# Patient Record
Sex: FEMALE | Race: WHITE | NOT HISPANIC OR LATINO | Employment: FULL TIME | URBAN - METROPOLITAN AREA
[De-identification: names, ages, dates, MRNs, and addresses within clinical notes are randomized per-mention and may not be internally consistent; named-entity substitution may affect disease eponyms.]

---

## 2023-06-18 ENCOUNTER — HOSPITAL ENCOUNTER (EMERGENCY)
Facility: HOSPITAL | Age: 43
Discharge: HOME | End: 2023-06-18
Attending: EMERGENCY MEDICINE | Admitting: EMERGENCY MEDICINE
Payer: COMMERCIAL

## 2023-06-18 ENCOUNTER — APPOINTMENT (OUTPATIENT)
Dept: GENERAL RADIOLOGY | Facility: HOSPITAL | Age: 43
End: 2023-06-18
Payer: COMMERCIAL

## 2023-06-18 ENCOUNTER — APPOINTMENT (OUTPATIENT)
Dept: CT IMAGING | Facility: HOSPITAL | Age: 43
End: 2023-06-18
Payer: COMMERCIAL

## 2023-06-18 VITALS
BODY MASS INDEX: 18.94 KG/M2 | HEIGHT: 68 IN | HEART RATE: 76 BPM | SYSTOLIC BLOOD PRESSURE: 105 MMHG | RESPIRATION RATE: 19 BRPM | TEMPERATURE: 99.5 F | OXYGEN SATURATION: 98 % | DIASTOLIC BLOOD PRESSURE: 60 MMHG | WEIGHT: 125 LBS

## 2023-06-18 DIAGNOSIS — J18.9 COMMUNITY ACQUIRED PNEUMONIA OF LEFT UPPER LOBE OF LUNG: Primary | ICD-10-CM

## 2023-06-18 LAB
ALBUMIN SERPL-MCNC: 4.1 G/DL (ref 3.5–5.2)
ALBUMIN/GLOB SERPL: 1.5 G/DL
ALP SERPL-CCNC: 63 U/L (ref 39–117)
ALT SERPL W P-5'-P-CCNC: 10 U/L (ref 1–33)
ANION GAP SERPL CALCULATED.3IONS-SCNC: 11 MMOL/L (ref 5–15)
AST SERPL-CCNC: 11 U/L (ref 1–32)
B PARAPERT DNA SPEC QL NAA+PROBE: NOT DETECTED
B PERT DNA SPEC QL NAA+PROBE: NOT DETECTED
BASOPHILS # BLD AUTO: 0.02 10*3/MM3 (ref 0–0.2)
BASOPHILS NFR BLD AUTO: 0.2 % (ref 0–1.5)
BILIRUB SERPL-MCNC: 0.6 MG/DL (ref 0–1.2)
BUN SERPL-MCNC: 10 MG/DL (ref 6–20)
BUN/CREAT SERPL: 13.2 (ref 7–25)
C PNEUM DNA NPH QL NAA+NON-PROBE: NOT DETECTED
CALCIUM SPEC-SCNC: 9 MG/DL (ref 8.6–10.5)
CHLORIDE SERPL-SCNC: 107 MMOL/L (ref 98–107)
CO2 SERPL-SCNC: 22 MMOL/L (ref 22–29)
CREAT SERPL-MCNC: 0.76 MG/DL (ref 0.57–1)
D-LACTATE SERPL-SCNC: 1.1 MMOL/L (ref 0.5–2)
DEPRECATED RDW RBC AUTO: 43.2 FL (ref 37–54)
EGFRCR SERPLBLD CKD-EPI 2021: 100.5 ML/MIN/1.73
EOSINOPHIL # BLD AUTO: 0 10*3/MM3 (ref 0–0.4)
EOSINOPHIL NFR BLD AUTO: 0 % (ref 0.3–6.2)
ERYTHROCYTE [DISTWIDTH] IN BLOOD BY AUTOMATED COUNT: 12.3 % (ref 12.3–15.4)
FLUAV SUBTYP SPEC NAA+PROBE: NOT DETECTED
FLUBV RNA ISLT QL NAA+PROBE: NOT DETECTED
GEN 5 2HR TROPONIN T REFLEX: <6 NG/L
GLOBULIN UR ELPH-MCNC: 2.8 GM/DL
GLUCOSE SERPL-MCNC: 97 MG/DL (ref 65–99)
HADV DNA SPEC NAA+PROBE: NOT DETECTED
HCOV 229E RNA SPEC QL NAA+PROBE: NOT DETECTED
HCOV HKU1 RNA SPEC QL NAA+PROBE: NOT DETECTED
HCOV NL63 RNA SPEC QL NAA+PROBE: NOT DETECTED
HCOV OC43 RNA SPEC QL NAA+PROBE: NOT DETECTED
HCT VFR BLD AUTO: 41.8 % (ref 34–46.6)
HGB BLD-MCNC: 13.1 G/DL (ref 12–15.9)
HMPV RNA NPH QL NAA+NON-PROBE: NOT DETECTED
HOLD SPECIMEN: NORMAL
HPIV1 RNA ISLT QL NAA+PROBE: NOT DETECTED
HPIV2 RNA SPEC QL NAA+PROBE: NOT DETECTED
HPIV3 RNA NPH QL NAA+PROBE: NOT DETECTED
HPIV4 P GENE NPH QL NAA+PROBE: NOT DETECTED
IMM GRANULOCYTES # BLD AUTO: 0.03 10*3/MM3 (ref 0–0.05)
IMM GRANULOCYTES NFR BLD AUTO: 0.3 % (ref 0–0.5)
LIPASE SERPL-CCNC: 18 U/L (ref 13–60)
LYMPHOCYTES # BLD AUTO: 0.66 10*3/MM3 (ref 0.7–3.1)
LYMPHOCYTES NFR BLD AUTO: 7.1 % (ref 19.6–45.3)
M PNEUMO IGG SER IA-ACNC: NOT DETECTED
MCH RBC QN AUTO: 30 PG (ref 26.6–33)
MCHC RBC AUTO-ENTMCNC: 31.3 G/DL (ref 31.5–35.7)
MCV RBC AUTO: 95.7 FL (ref 79–97)
MONOCYTES # BLD AUTO: 0.75 10*3/MM3 (ref 0.1–0.9)
MONOCYTES NFR BLD AUTO: 8 % (ref 5–12)
NEUTROPHILS NFR BLD AUTO: 7.9 10*3/MM3 (ref 1.7–7)
NEUTROPHILS NFR BLD AUTO: 84.4 % (ref 42.7–76)
NRBC BLD AUTO-RTO: 0 /100 WBC (ref 0–0.2)
NT-PROBNP SERPL-MCNC: 827.6 PG/ML (ref 0–450)
PLATELET # BLD AUTO: 145 10*3/MM3 (ref 140–450)
PMV BLD AUTO: 11.2 FL (ref 6–12)
POTASSIUM SERPL-SCNC: 3.1 MMOL/L (ref 3.5–5.2)
PROT SERPL-MCNC: 6.9 G/DL (ref 6–8.5)
RBC # BLD AUTO: 4.37 10*6/MM3 (ref 3.77–5.28)
RHINOVIRUS RNA SPEC NAA+PROBE: NOT DETECTED
RSV RNA NPH QL NAA+NON-PROBE: NOT DETECTED
SARS-COV-2 RNA NPH QL NAA+NON-PROBE: NOT DETECTED
SODIUM SERPL-SCNC: 140 MMOL/L (ref 136–145)
TROPONIN T DELTA: NORMAL
TROPONIN T SERPL HS-MCNC: <6 NG/L
WBC NRBC COR # BLD: 9.36 10*3/MM3 (ref 3.4–10.8)
WHOLE BLOOD HOLD COAG: NORMAL
WHOLE BLOOD HOLD SPECIMEN: NORMAL

## 2023-06-18 PROCEDURE — 80053 COMPREHEN METABOLIC PANEL: CPT | Performed by: EMERGENCY MEDICINE

## 2023-06-18 PROCEDURE — 96375 TX/PRO/DX INJ NEW DRUG ADDON: CPT

## 2023-06-18 PROCEDURE — 83605 ASSAY OF LACTIC ACID: CPT | Performed by: NURSE PRACTITIONER

## 2023-06-18 PROCEDURE — 25010000002 CEFTRIAXONE PER 250 MG: Performed by: NURSE PRACTITIONER

## 2023-06-18 PROCEDURE — 36415 COLL VENOUS BLD VENIPUNCTURE: CPT

## 2023-06-18 PROCEDURE — 83690 ASSAY OF LIPASE: CPT | Performed by: EMERGENCY MEDICINE

## 2023-06-18 PROCEDURE — 93005 ELECTROCARDIOGRAM TRACING: CPT

## 2023-06-18 PROCEDURE — 71275 CT ANGIOGRAPHY CHEST: CPT

## 2023-06-18 PROCEDURE — 93005 ELECTROCARDIOGRAM TRACING: CPT | Performed by: EMERGENCY MEDICINE

## 2023-06-18 PROCEDURE — 0202U NFCT DS 22 TRGT SARS-COV-2: CPT | Performed by: NURSE PRACTITIONER

## 2023-06-18 PROCEDURE — 84484 ASSAY OF TROPONIN QUANT: CPT | Performed by: EMERGENCY MEDICINE

## 2023-06-18 PROCEDURE — 71045 X-RAY EXAM CHEST 1 VIEW: CPT

## 2023-06-18 PROCEDURE — 25510000001 IOPAMIDOL PER 1 ML: Performed by: EMERGENCY MEDICINE

## 2023-06-18 PROCEDURE — 83880 ASSAY OF NATRIURETIC PEPTIDE: CPT | Performed by: EMERGENCY MEDICINE

## 2023-06-18 PROCEDURE — 96365 THER/PROPH/DIAG IV INF INIT: CPT

## 2023-06-18 PROCEDURE — 99284 EMERGENCY DEPT VISIT MOD MDM: CPT

## 2023-06-18 PROCEDURE — 87040 BLOOD CULTURE FOR BACTERIA: CPT | Performed by: NURSE PRACTITIONER

## 2023-06-18 PROCEDURE — 85025 COMPLETE CBC W/AUTO DIFF WBC: CPT | Performed by: EMERGENCY MEDICINE

## 2023-06-18 PROCEDURE — 25010000002 KETOROLAC TROMETHAMINE PER 15 MG: Performed by: NURSE PRACTITIONER

## 2023-06-18 RX ORDER — ASPIRIN 81 MG/1
324 TABLET, CHEWABLE ORAL ONCE
Status: COMPLETED | OUTPATIENT
Start: 2023-06-18 | End: 2023-06-18

## 2023-06-18 RX ORDER — SODIUM CHLORIDE 0.9 % (FLUSH) 0.9 %
10 SYRINGE (ML) INJECTION AS NEEDED
Status: DISCONTINUED | OUTPATIENT
Start: 2023-06-18 | End: 2023-06-18 | Stop reason: HOSPADM

## 2023-06-18 RX ORDER — OMEPRAZOLE 40 MG/1
40 CAPSULE, DELAYED RELEASE ORAL DAILY
COMMUNITY

## 2023-06-18 RX ORDER — ASPIRIN 81 MG/1
81 TABLET ORAL DAILY
COMMUNITY

## 2023-06-18 RX ORDER — TIZANIDINE 4 MG/1
4 TABLET ORAL NIGHTLY PRN
COMMUNITY

## 2023-06-18 RX ORDER — ACETAMINOPHEN 500 MG
1000 TABLET ORAL ONCE
Status: COMPLETED | OUTPATIENT
Start: 2023-06-18 | End: 2023-06-18

## 2023-06-18 RX ORDER — DOCUSATE CALCIUM 240 MG
240 CAPSULE ORAL 2 TIMES DAILY
COMMUNITY

## 2023-06-18 RX ORDER — CEFDINIR 300 MG/1
300 CAPSULE ORAL 2 TIMES DAILY
Qty: 14 CAPSULE | Refills: 0 | Status: SHIPPED | OUTPATIENT
Start: 2023-06-18

## 2023-06-18 RX ORDER — KETOROLAC TROMETHAMINE 15 MG/ML
15 INJECTION, SOLUTION INTRAMUSCULAR; INTRAVENOUS ONCE
Status: COMPLETED | OUTPATIENT
Start: 2023-06-18 | End: 2023-06-18

## 2023-06-18 RX ORDER — AZITHROMYCIN 250 MG/1
TABLET, FILM COATED ORAL
Qty: 6 TABLET | Refills: 0 | Status: SHIPPED | OUTPATIENT
Start: 2023-06-18

## 2023-06-18 RX ORDER — HYDROCODONE BITARTRATE AND ACETAMINOPHEN 10; 325 MG/1; MG/1
1 TABLET ORAL EVERY 6 HOURS PRN
COMMUNITY

## 2023-06-18 RX ORDER — TOPIRAMATE 100 MG/1
100 TABLET, FILM COATED ORAL 2 TIMES DAILY
COMMUNITY

## 2023-06-18 RX ADMIN — KETOROLAC TROMETHAMINE 15 MG: 15 INJECTION, SOLUTION INTRAMUSCULAR; INTRAVENOUS at 16:06

## 2023-06-18 RX ADMIN — SODIUM CHLORIDE 1 G: 900 INJECTION INTRAVENOUS at 16:08

## 2023-06-18 RX ADMIN — ASPIRIN 243 MG: 81 TABLET, CHEWABLE ORAL at 11:46

## 2023-06-18 RX ADMIN — SODIUM CHLORIDE 1000 ML: 9 INJECTION, SOLUTION INTRAVENOUS at 13:19

## 2023-06-18 RX ADMIN — IOPAMIDOL 75 ML: 755 INJECTION, SOLUTION INTRAVENOUS at 14:19

## 2023-06-18 RX ADMIN — ACETAMINOPHEN 1000 MG: 500 TABLET ORAL at 13:18

## 2023-06-18 NOTE — DISCHARGE INSTRUCTIONS
Antiobiotics as directed.  Start oral antibiotics in the morning.  Anticipate fever again over the next 48 hours.  Tylenol 650 mg to 1000 mg every 4 hours as needed.  Maintain your very best hydration and nutrition.  Take antibiotics with ample food and fluids.  Consider probiotics for the next 30 days afterward.  Follow-up this week with your primary care provider.  Return to the emergency department as needed for worsening symptoms or concerns.  Thank you
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
## 2023-06-19 LAB
QT INTERVAL: 334 MS
QT INTERVAL: 432 MS
QTC INTERVAL: 426 MS
QTC INTERVAL: 495 MS

## 2023-06-23 LAB
BACTERIA SPEC AEROBE CULT: NORMAL
BACTERIA SPEC AEROBE CULT: NORMAL

## 2023-12-12 NOTE — PROGRESS NOTES
[facility] Cardiology  Consultation H&P  Radha Florence  1980  [address]   [place] [address]     VISIT DATE:  12/13/23    PCP: Chantel Baker MD  [address]  [place] [address]    IDENTIFICATION: A 42 y.o. female  [employer] employee    PROBLEM LIST:  Family Hx CAD  Abnormal EKG  Echo, 1/19 ([facility]): EF > 55%, WNL  DDD  Migraines  GERD       CC:  Chief Complaint   Patient presents with    Establish Care     New Patient    Chest Pain       Allergies  Allergies   Allergen Reactions    Vancomycin Other (See Comments)     Vancomycin infusion reaction after completion of administration; tolerated w/ slowed infusion rate       Current Medications  Current Outpatient Medications   Medication Instructions    albuterol sulfate  (90 Base) MCG/ACT inhaler As Needed.    amitriptyline (ELAVIL) 100 mg, Oral, Nightly    docusate sodium 100 mg, Oral, As Needed    DULoxetine (CYMBALTA) 60 mg, Oral, Daily    gabapentin (NEURONTIN) 800 mg, Oral, Daily    HYDROcodone-acetaminophen (NORCO)  MG per tablet 1 tablet, Oral, Every 6 Hours PRN    lidocaine (LIDODERM) 5 % 1 patch, Topical, As Needed    Nurtec 75 mg, Oral, Every 48 Hours    pantoprazole (PROTONIX) 40 mg, Oral, Daily    predniSONE (DELTASONE) 20 MG tablet As Needed.    promethazine (PHENERGAN) 25 MG tablet As Needed.    PSYLLIUM PO 1 packet, Oral    SUMAtriptan (IMITREX) 50 mg, Oral, As Needed    tiZANidine (ZANAFLEX) 4 mg, Oral, Nightly PRN    topiramate (TOPAMAX) 100 mg, Oral, Daily        History of Present Illness   HPI  Radha Florence is a 42 y.o. year old female with the above mentioned PMH who presents for consult from Chantel Baker MD for evaluation of [place] sclerosis family history.  She is undergone EKG systolic but nothing other.  Her father was a patient of [facility] and she wanted preventive evaluation.  Notes no overt cardiac issues however she does note having recurrent pneumonias over several years over 10 in number.   "She does get choked with eating certain food types not specifically dry proteins.  She has undergone endoscopy remotely and has been on PPI for a long period of time.  She states that she gets choked and coughs regularly with eating.  She has not seen ENTs historically.    Pt denies any chest pain, dyspnea at rest, dyspnea on exertion, orthopnea, PND, palpitations, lower extremity edema, or claudication. Pt denies history of CHF, DVT, PE, MI, CVA, TIA, or rheumatic fever.       ROS: All systems have been reviewed and are negative with the exception of those mentioned in the HPI and problem list above.    SOCIAL HX  Social History     Socioeconomic History    Marital status:    Tobacco Use    Smoking status: Never    Smokeless tobacco: Never   Vaping Use    Vaping Use: Never used   Substance and Sexual Activity    Alcohol use: Never    Drug use: Never    Sexual activity: Defer       FAMILY HX  Family History   Problem Relation Age of Onset    Heart attack Mother     Heart disease Father     Lung disease Father     Diabetes Sister     Diabetes Brother        Vitals:    12/13/23 1402   BP: 110/70   BP Location: Right arm   Patient Position: Sitting   Pulse: 63   SpO2: 99%   Weight: 51.7 kg (114 lb)   Height: 175.3 cm (69\")     Body mass index is 16.83 kg/m².     PHYSICAL EXAMINATION:  Constitutional:       Appearance: Not in distress. Frail.   Neck:      Vascular: No JVR. JVD normal.   Pulmonary:      Effort: Pulmonary effort is normal.      Breath sounds: Normal breath sounds. No wheezing. No rhonchi. No rales.   Chest:      Chest wall: Not tender to palpatation.   Cardiovascular:      PMI at left midclavicular line. Normal rate. Regular rhythm. Normal S1. Normal S2.       Murmurs: There is no murmur.      No gallop.  No click. No rub.   Pulses:     Intact distal pulses.   Edema:     Peripheral edema absent.   Abdominal:      General: Bowel sounds are normal.      Palpations: Abdomen is soft.      Tenderness: " "There is no abdominal tenderness.   Musculoskeletal: Normal range of motion.         General: No tenderness. Skin:     General: Skin is warm and dry.   Neurological:      General: No focal deficit present.      Mental Status: Alert and oriented to person, place and time.         Diagnostic Data:    ECG 12 Lead    Date/Time: 12/13/2023 2:30 PM  Performed by: Palmer Suarez MD    Authorized by: Palmer Suarez MD  Comparison: compared with previous ECG   Rhythm: sinus rhythm  BPM: 64    Clinical impression: non-specific ECG          Labs:    Lab Results   Component Value Date    GLUCOSE 161 (H) 06/22/2023    CALCIUM 9.0 06/18/2023     06/18/2023    K 3.1 (L) 06/22/2023    CO2 22.0 06/18/2023     06/22/2023    BUN 10 06/18/2023    CREATININE 0.76 06/18/2023    EGFR 100.5 06/18/2023    BCR 13.2 06/18/2023    ANIONGAP 11.0 06/18/2023     No results found for: \"HGBA1C\"  Lab Results   Component Value Date    WBC 10.08 06/24/2023    HGB 10.0 (L) 06/24/2023    HCT 31.3 (L) 06/24/2023    MCV 93 06/24/2023     06/24/2023     Lipid, 2/2021:  153/59/60/81  TSH, 7/2023:  0.75  A1c, 2/2021:  5.5      Advance Care Planning   ACP discussion was held with the patient during this visit. Patient has an advance directive in EMR which is still valid.          ASSESSMENT:   Diagnosis Plan   1. Coronary artery disease involving native coronary artery of native heart without angina pectoris            PLAN:  Family history of precocious atherosclerosis with screening with cardiac calcium scoring at this time.  Predicated on these findings would delineate need for medical therapy.    Recurrent pneumonias and questionable swallowing dysfunction.  I would refer her to ENT for swallowing mechanism evaluation        Chantel Baker MD, thank you for referring Ms. Florence for evaluation.  I have forwarded my electronically generated recommendations to you for review.  Please do not hesitate to call with any " questions.      Palmer Suarez MD, FACC

## 2023-12-13 ENCOUNTER — OFFICE VISIT (OUTPATIENT)
Dept: CARDIOLOGY | Facility: CLINIC | Age: 43
End: 2023-12-13
Payer: COMMERCIAL

## 2023-12-13 VITALS
WEIGHT: 114 LBS | SYSTOLIC BLOOD PRESSURE: 110 MMHG | BODY MASS INDEX: 16.88 KG/M2 | DIASTOLIC BLOOD PRESSURE: 70 MMHG | HEART RATE: 63 BPM | OXYGEN SATURATION: 99 % | HEIGHT: 69 IN

## 2023-12-13 DIAGNOSIS — R13.10 DYSPHAGIA, UNSPECIFIED TYPE: ICD-10-CM

## 2023-12-13 DIAGNOSIS — I25.10 CORONARY ARTERY DISEASE INVOLVING NATIVE CORONARY ARTERY OF NATIVE HEART WITHOUT ANGINA PECTORIS: Primary | ICD-10-CM

## 2023-12-13 RX ORDER — PSEUDOEPHEDRINE HCL 30 MG
100 TABLET ORAL AS NEEDED
COMMUNITY
Start: 2023-11-21 | End: 2024-02-19

## 2023-12-13 RX ORDER — PANTOPRAZOLE SODIUM 40 MG/1
40 TABLET, DELAYED RELEASE ORAL DAILY
COMMUNITY
Start: 2023-11-21 | End: 2024-02-19

## 2023-12-13 RX ORDER — LIDOCAINE 50 MG/G
1 PATCH TOPICAL AS NEEDED
COMMUNITY
Start: 2023-08-17 | End: 2024-08-16

## 2023-12-13 RX ORDER — GABAPENTIN 800 MG/1
800 TABLET ORAL DAILY
COMMUNITY
Start: 2023-11-21 | End: 2023-12-21

## 2023-12-13 RX ORDER — ALBUTEROL SULFATE 90 UG/1
AEROSOL, METERED RESPIRATORY (INHALATION) AS NEEDED
COMMUNITY
Start: 2023-08-17

## 2023-12-13 RX ORDER — DULOXETIN HYDROCHLORIDE 60 MG/1
60 CAPSULE, DELAYED RELEASE ORAL DAILY
COMMUNITY
Start: 2023-11-21 | End: 2024-02-19

## 2023-12-13 RX ORDER — PROMETHAZINE HYDROCHLORIDE 25 MG/1
TABLET ORAL AS NEEDED
COMMUNITY
Start: 2023-08-17

## 2023-12-13 RX ORDER — SUMATRIPTAN 50 MG/1
50 TABLET, FILM COATED ORAL AS NEEDED
COMMUNITY
Start: 2023-11-21 | End: 2024-02-19

## 2023-12-13 RX ORDER — AMITRIPTYLINE HYDROCHLORIDE 100 MG/1
100 TABLET ORAL NIGHTLY
COMMUNITY
Start: 2023-11-21 | End: 2024-02-19

## 2023-12-13 RX ORDER — RIMEGEPANT SULFATE 75 MG/75MG
75 TABLET, ORALLY DISINTEGRATING ORAL
COMMUNITY
Start: 2023-11-21 | End: 2024-02-19

## 2023-12-13 RX ORDER — PREDNISONE 20 MG/1
TABLET ORAL AS NEEDED
COMMUNITY
Start: 2023-10-31

## 2024-02-13 ENCOUNTER — TELEPHONE (OUTPATIENT)
Dept: CARDIOLOGY | Facility: CLINIC | Age: 44
End: 2024-02-13
Payer: COMMERCIAL

## 2024-04-06 ENCOUNTER — HOSPITAL ENCOUNTER (OUTPATIENT)
Facility: HOSPITAL | Age: 44
Setting detail: OBSERVATION
Discharge: HOME | End: 2024-04-08
Attending: EMERGENCY MEDICINE | Admitting: HOSPITALIST
Payer: COMMERCIAL

## 2024-04-06 ENCOUNTER — APPOINTMENT (OUTPATIENT)
Dept: GENERAL RADIOLOGY | Facility: HOSPITAL | Age: 44
End: 2024-04-06
Payer: COMMERCIAL

## 2024-04-06 ENCOUNTER — APPOINTMENT (OUTPATIENT)
Dept: ULTRASOUND IMAGING | Facility: HOSPITAL | Age: 44
End: 2024-04-06
Payer: COMMERCIAL

## 2024-04-06 ENCOUNTER — APPOINTMENT (OUTPATIENT)
Dept: CT IMAGING | Facility: HOSPITAL | Age: 44
End: 2024-04-06
Payer: COMMERCIAL

## 2024-04-06 DIAGNOSIS — I82.90 THROMBUS: Primary | ICD-10-CM

## 2024-04-06 DIAGNOSIS — R13.10 DYSPHAGIA, UNSPECIFIED TYPE: ICD-10-CM

## 2024-04-06 PROBLEM — I82.409 DVT (DEEP VENOUS THROMBOSIS): Status: ACTIVE | Noted: 2024-04-06

## 2024-04-06 LAB
ALBUMIN SERPL-MCNC: 4.7 G/DL (ref 3.5–5.2)
ALBUMIN/GLOB SERPL: 1.7 G/DL
ALP SERPL-CCNC: 49 U/L (ref 39–117)
ALT SERPL W P-5'-P-CCNC: 9 U/L (ref 1–33)
ANION GAP SERPL CALCULATED.3IONS-SCNC: 9 MMOL/L (ref 5–15)
APTT PPP: 28.2 SECONDS (ref 60–90)
AST SERPL-CCNC: 17 U/L (ref 1–32)
BACTERIA UR QL AUTO: ABNORMAL /HPF
BASOPHILS # BLD AUTO: 0.03 10*3/MM3 (ref 0–0.2)
BASOPHILS NFR BLD AUTO: 0.5 % (ref 0–1.5)
BILIRUB SERPL-MCNC: 0.5 MG/DL (ref 0–1.2)
BILIRUB UR QL STRIP: NEGATIVE
BUN SERPL-MCNC: 15 MG/DL (ref 6–20)
BUN/CREAT SERPL: 18.8 (ref 7–25)
CALCIUM SPEC-SCNC: 9.1 MG/DL (ref 8.6–10.5)
CHLORIDE SERPL-SCNC: 107 MMOL/L (ref 98–107)
CLARITY UR: ABNORMAL
CO2 SERPL-SCNC: 25 MMOL/L (ref 22–29)
COLOR UR: YELLOW
CREAT SERPL-MCNC: 0.8 MG/DL (ref 0.57–1)
CRP SERPL-MCNC: <0.3 MG/DL (ref 0–0.5)
D DIMER PPP FEU-MCNC: 0.42 MCGFEU/ML (ref 0–0.5)
DEPRECATED RDW RBC AUTO: 41.7 FL (ref 37–54)
EGFRCR SERPLBLD CKD-EPI 2021: 93.9 ML/MIN/1.73
EOSINOPHIL # BLD AUTO: 0.09 10*3/MM3 (ref 0–0.4)
EOSINOPHIL NFR BLD AUTO: 1.4 % (ref 0.3–6.2)
ERYTHROCYTE [DISTWIDTH] IN BLOOD BY AUTOMATED COUNT: 12 % (ref 12.3–15.4)
ERYTHROCYTE [SEDIMENTATION RATE] IN BLOOD: 2 MM/HR (ref 0–20)
GLOBULIN UR ELPH-MCNC: 2.7 GM/DL
GLUCOSE SERPL-MCNC: 102 MG/DL (ref 65–99)
GLUCOSE UR STRIP-MCNC: NEGATIVE MG/DL
HCT VFR BLD AUTO: 41.9 % (ref 34–46.6)
HGB BLD-MCNC: 13.3 G/DL (ref 12–15.9)
HGB UR QL STRIP.AUTO: NEGATIVE
HOLD SPECIMEN: NORMAL
HYALINE CASTS UR QL AUTO: ABNORMAL /LPF
IMM GRANULOCYTES # BLD AUTO: 0.02 10*3/MM3 (ref 0–0.05)
IMM GRANULOCYTES NFR BLD AUTO: 0.3 % (ref 0–0.5)
INR PPP: 1.07 (ref 0.89–1.12)
KETONES UR QL STRIP: NEGATIVE
LEUKOCYTE ESTERASE UR QL STRIP.AUTO: NEGATIVE
LYMPHOCYTES # BLD AUTO: 2.02 10*3/MM3 (ref 0.7–3.1)
LYMPHOCYTES NFR BLD AUTO: 31.4 % (ref 19.6–45.3)
MAGNESIUM SERPL-MCNC: 2 MG/DL (ref 1.6–2.6)
MCH RBC QN AUTO: 29.9 PG (ref 26.6–33)
MCHC RBC AUTO-ENTMCNC: 31.7 G/DL (ref 31.5–35.7)
MCV RBC AUTO: 94.2 FL (ref 79–97)
MONOCYTES # BLD AUTO: 0.45 10*3/MM3 (ref 0.1–0.9)
MONOCYTES NFR BLD AUTO: 7 % (ref 5–12)
NEUTROPHILS NFR BLD AUTO: 3.82 10*3/MM3 (ref 1.7–7)
NEUTROPHILS NFR BLD AUTO: 59.4 % (ref 42.7–76)
NITRITE UR QL STRIP: NEGATIVE
NRBC BLD AUTO-RTO: 0 /100 WBC (ref 0–0.2)
PH UR STRIP.AUTO: 5.5 [PH] (ref 5–8)
PLATELET # BLD AUTO: 187 10*3/MM3 (ref 140–450)
PMV BLD AUTO: 10.8 FL (ref 6–12)
POTASSIUM SERPL-SCNC: 3.4 MMOL/L (ref 3.5–5.2)
PROT SERPL-MCNC: 7.4 G/DL (ref 6–8.5)
PROT UR QL STRIP: NEGATIVE
PROTHROMBIN TIME: 14 SECONDS (ref 12.2–14.5)
QT INTERVAL: 418 MS
QTC INTERVAL: 447 MS
RBC # BLD AUTO: 4.45 10*6/MM3 (ref 3.77–5.28)
RBC # UR STRIP: ABNORMAL /HPF
REF LAB TEST METHOD: ABNORMAL
SODIUM SERPL-SCNC: 141 MMOL/L (ref 136–145)
SP GR UR STRIP: 1.02 (ref 1–1.03)
SQUAMOUS #/AREA URNS HPF: ABNORMAL /HPF
TROPONIN T SERPL HS-MCNC: <6 NG/L
UFH PPP CHRO-ACNC: 0.1 IU/ML (ref 0.3–0.7)
UROBILINOGEN UR QL STRIP: ABNORMAL
WBC # UR STRIP: ABNORMAL /HPF
WBC NRBC COR # BLD AUTO: 6.43 10*3/MM3 (ref 3.4–10.8)
WHOLE BLOOD HOLD COAG: NORMAL
WHOLE BLOOD HOLD SPECIMEN: NORMAL

## 2024-04-06 PROCEDURE — 83735 ASSAY OF MAGNESIUM: CPT

## 2024-04-06 PROCEDURE — 96366 THER/PROPH/DIAG IV INF ADDON: CPT

## 2024-04-06 PROCEDURE — 71045 X-RAY EXAM CHEST 1 VIEW: CPT

## 2024-04-06 PROCEDURE — 96365 THER/PROPH/DIAG IV INF INIT: CPT

## 2024-04-06 PROCEDURE — 86235 NUCLEAR ANTIGEN ANTIBODY: CPT | Performed by: INTERNAL MEDICINE

## 2024-04-06 PROCEDURE — 74177 CT ABD & PELVIS W/CONTRAST: CPT

## 2024-04-06 PROCEDURE — 81241 F5 GENE: CPT | Performed by: INTERNAL MEDICINE

## 2024-04-06 PROCEDURE — 82378 CARCINOEMBRYONIC ANTIGEN: CPT | Performed by: INTERNAL MEDICINE

## 2024-04-06 PROCEDURE — 85732 THROMBOPLASTIN TIME PARTIAL: CPT | Performed by: INTERNAL MEDICINE

## 2024-04-06 PROCEDURE — 85306 CLOT INHIBIT PROT S FREE: CPT | Performed by: INTERNAL MEDICINE

## 2024-04-06 PROCEDURE — 25510000001 IOPAMIDOL 61 % SOLUTION: Performed by: EMERGENCY MEDICINE

## 2024-04-06 PROCEDURE — 96375 TX/PRO/DX INJ NEW DRUG ADDON: CPT

## 2024-04-06 PROCEDURE — 86147 CARDIOLIPIN ANTIBODY EA IG: CPT | Performed by: INTERNAL MEDICINE

## 2024-04-06 PROCEDURE — 99285 EMERGENCY DEPT VISIT HI MDM: CPT

## 2024-04-06 PROCEDURE — 80053 COMPREHEN METABOLIC PANEL: CPT

## 2024-04-06 PROCEDURE — 87591 N.GONORRHOEAE DNA AMP PROB: CPT | Performed by: INTERNAL MEDICINE

## 2024-04-06 PROCEDURE — 86146 BETA-2 GLYCOPROTEIN ANTIBODY: CPT | Performed by: INTERNAL MEDICINE

## 2024-04-06 PROCEDURE — G0378 HOSPITAL OBSERVATION PER HR: HCPCS

## 2024-04-06 PROCEDURE — 84484 ASSAY OF TROPONIN QUANT: CPT

## 2024-04-06 PROCEDURE — 99222 1ST HOSP IP/OBS MODERATE 55: CPT | Performed by: INTERNAL MEDICINE

## 2024-04-06 PROCEDURE — 25010000002 HEPARIN (PORCINE) PER 1000 UNITS: Performed by: INTERNAL MEDICINE

## 2024-04-06 PROCEDURE — 86148 ANTI-PHOSPHOLIPID ANTIBODY: CPT | Performed by: INTERNAL MEDICINE

## 2024-04-06 PROCEDURE — 81001 URINALYSIS AUTO W/SCOPE: CPT

## 2024-04-06 PROCEDURE — 85705 THROMBOPLASTIN INHIBITION: CPT | Performed by: INTERNAL MEDICINE

## 2024-04-06 PROCEDURE — 85730 THROMBOPLASTIN TIME PARTIAL: CPT | Performed by: INTERNAL MEDICINE

## 2024-04-06 PROCEDURE — 25010000002 ONDANSETRON PER 1 MG: Performed by: INTERNAL MEDICINE

## 2024-04-06 PROCEDURE — 85305 CLOT INHIBIT PROT S TOTAL: CPT | Performed by: INTERNAL MEDICINE

## 2024-04-06 PROCEDURE — 81240 F2 GENE: CPT | Performed by: INTERNAL MEDICINE

## 2024-04-06 PROCEDURE — 99291 CRITICAL CARE FIRST HOUR: CPT

## 2024-04-06 PROCEDURE — 85520 HEPARIN ASSAY: CPT | Performed by: INTERNAL MEDICINE

## 2024-04-06 PROCEDURE — 85652 RBC SED RATE AUTOMATED: CPT | Performed by: INTERNAL MEDICINE

## 2024-04-06 PROCEDURE — 76705 ECHO EXAM OF ABDOMEN: CPT

## 2024-04-06 PROCEDURE — 85025 COMPLETE CBC W/AUTO DIFF WBC: CPT

## 2024-04-06 PROCEDURE — 86225 DNA ANTIBODY NATIVE: CPT | Performed by: INTERNAL MEDICINE

## 2024-04-06 PROCEDURE — 85613 RUSSELL VIPER VENOM DILUTED: CPT | Performed by: INTERNAL MEDICINE

## 2024-04-06 PROCEDURE — 25010000002 HEPARIN (PORCINE) 25000-0.45 UT/250ML-% SOLUTION: Performed by: INTERNAL MEDICINE

## 2024-04-06 PROCEDURE — 85302 CLOT INHIBIT PROT C ANTIGEN: CPT | Performed by: INTERNAL MEDICINE

## 2024-04-06 PROCEDURE — 85379 FIBRIN DEGRADATION QUANT: CPT | Performed by: INTERNAL MEDICINE

## 2024-04-06 PROCEDURE — 86304 IMMUNOASSAY TUMOR CA 125: CPT | Performed by: INTERNAL MEDICINE

## 2024-04-06 PROCEDURE — 83090 ASSAY OF HOMOCYSTEINE: CPT | Performed by: INTERNAL MEDICINE

## 2024-04-06 PROCEDURE — 86301 IMMUNOASSAY TUMOR CA 19-9: CPT | Performed by: INTERNAL MEDICINE

## 2024-04-06 PROCEDURE — 85610 PROTHROMBIN TIME: CPT | Performed by: INTERNAL MEDICINE

## 2024-04-06 PROCEDURE — 85670 THROMBIN TIME PLASMA: CPT | Performed by: INTERNAL MEDICINE

## 2024-04-06 PROCEDURE — 93005 ELECTROCARDIOGRAM TRACING: CPT

## 2024-04-06 PROCEDURE — 86140 C-REACTIVE PROTEIN: CPT | Performed by: INTERNAL MEDICINE

## 2024-04-06 PROCEDURE — 82105 ALPHA-FETOPROTEIN SERUM: CPT | Performed by: INTERNAL MEDICINE

## 2024-04-06 PROCEDURE — 85300 ANTITHROMBIN III ACTIVITY: CPT | Performed by: INTERNAL MEDICINE

## 2024-04-06 PROCEDURE — 87491 CHLMYD TRACH DNA AMP PROBE: CPT | Performed by: INTERNAL MEDICINE

## 2024-04-06 PROCEDURE — 85303 CLOT INHIBIT PROT C ACTIVITY: CPT | Performed by: INTERNAL MEDICINE

## 2024-04-06 RX ORDER — SODIUM CHLORIDE 0.9 % (FLUSH) 0.9 %
10 SYRINGE (ML) INJECTION EVERY 12 HOURS SCHEDULED
Status: DISCONTINUED | OUTPATIENT
Start: 2024-04-06 | End: 2024-04-08 | Stop reason: HOSPADM

## 2024-04-06 RX ORDER — BISACODYL 10 MG
10 SUPPOSITORY, RECTAL RECTAL DAILY PRN
Status: DISCONTINUED | OUTPATIENT
Start: 2024-04-06 | End: 2024-04-08 | Stop reason: HOSPADM

## 2024-04-06 RX ORDER — ACETAMINOPHEN 325 MG/1
650 TABLET ORAL EVERY 4 HOURS PRN
Status: DISCONTINUED | OUTPATIENT
Start: 2024-04-06 | End: 2024-04-08 | Stop reason: HOSPADM

## 2024-04-06 RX ORDER — BISACODYL 5 MG/1
5 TABLET, DELAYED RELEASE ORAL DAILY PRN
Status: DISCONTINUED | OUTPATIENT
Start: 2024-04-06 | End: 2024-04-08 | Stop reason: HOSPADM

## 2024-04-06 RX ORDER — DULOXETIN HYDROCHLORIDE 60 MG/1
60 CAPSULE, DELAYED RELEASE ORAL DAILY
Status: DISCONTINUED | OUTPATIENT
Start: 2024-04-07 | End: 2024-04-08 | Stop reason: HOSPADM

## 2024-04-06 RX ORDER — POLYETHYLENE GLYCOL 3350 17 G/17G
17 POWDER, FOR SOLUTION ORAL DAILY PRN
Status: DISCONTINUED | OUTPATIENT
Start: 2024-04-06 | End: 2024-04-08 | Stop reason: HOSPADM

## 2024-04-06 RX ORDER — DIAPER,BRIEF,INFANT-TODD,DISP
1 EACH MISCELLANEOUS EVERY 12 HOURS SCHEDULED
Status: DISCONTINUED | OUTPATIENT
Start: 2024-04-06 | End: 2024-04-08 | Stop reason: HOSPADM

## 2024-04-06 RX ORDER — HEPARIN SODIUM 1000 [USP'U]/ML
50 INJECTION, SOLUTION INTRAVENOUS; SUBCUTANEOUS AS NEEDED
Status: DISCONTINUED | OUTPATIENT
Start: 2024-04-06 | End: 2024-04-06

## 2024-04-06 RX ORDER — SODIUM CHLORIDE 9 MG/ML
40 INJECTION, SOLUTION INTRAVENOUS AS NEEDED
Status: DISCONTINUED | OUTPATIENT
Start: 2024-04-06 | End: 2024-04-08 | Stop reason: HOSPADM

## 2024-04-06 RX ORDER — GABAPENTIN 400 MG/1
800 CAPSULE ORAL DAILY
Status: DISCONTINUED | OUTPATIENT
Start: 2024-04-07 | End: 2024-04-08 | Stop reason: HOSPADM

## 2024-04-06 RX ORDER — AMOXICILLIN 250 MG
2 CAPSULE ORAL 2 TIMES DAILY PRN
Status: DISCONTINUED | OUTPATIENT
Start: 2024-04-06 | End: 2024-04-08 | Stop reason: HOSPADM

## 2024-04-06 RX ORDER — HEPARIN SODIUM 10000 [USP'U]/100ML
18 INJECTION, SOLUTION INTRAVENOUS
Status: DISCONTINUED | OUTPATIENT
Start: 2024-04-06 | End: 2024-04-08

## 2024-04-06 RX ORDER — SODIUM CHLORIDE 0.9 % (FLUSH) 0.9 %
10 SYRINGE (ML) INJECTION AS NEEDED
Status: DISCONTINUED | OUTPATIENT
Start: 2024-04-06 | End: 2024-04-08 | Stop reason: HOSPADM

## 2024-04-06 RX ORDER — HEPARIN SODIUM 1000 [USP'U]/ML
80 INJECTION, SOLUTION INTRAVENOUS; SUBCUTANEOUS ONCE
Qty: 10 ML | Refills: 0 | Status: COMPLETED | OUTPATIENT
Start: 2024-04-06 | End: 2024-04-06

## 2024-04-06 RX ORDER — HEPARIN SODIUM 1000 [USP'U]/ML
25 INJECTION, SOLUTION INTRAVENOUS; SUBCUTANEOUS AS NEEDED
Status: DISCONTINUED | OUTPATIENT
Start: 2024-04-06 | End: 2024-04-06

## 2024-04-06 RX ORDER — POTASSIUM CHLORIDE 20 MEQ/1
40 TABLET, EXTENDED RELEASE ORAL EVERY 4 HOURS
Status: COMPLETED | OUTPATIENT
Start: 2024-04-06 | End: 2024-04-07

## 2024-04-06 RX ORDER — ONDANSETRON 2 MG/ML
4 INJECTION INTRAMUSCULAR; INTRAVENOUS EVERY 6 HOURS PRN
Status: DISCONTINUED | OUTPATIENT
Start: 2024-04-06 | End: 2024-04-08 | Stop reason: HOSPADM

## 2024-04-06 RX ORDER — HYDROCODONE BITARTRATE AND ACETAMINOPHEN 10; 325 MG/1; MG/1
1 TABLET ORAL EVERY 6 HOURS PRN
Status: DISCONTINUED | OUTPATIENT
Start: 2024-04-06 | End: 2024-04-08 | Stop reason: HOSPADM

## 2024-04-06 RX ADMIN — HYDROCODONE BITARTRATE AND ACETAMINOPHEN 1 TABLET: 10; 325 TABLET ORAL at 22:11

## 2024-04-06 RX ADMIN — ONDANSETRON 4 MG: 2 INJECTION INTRAMUSCULAR; INTRAVENOUS at 22:15

## 2024-04-06 RX ADMIN — POTASSIUM CHLORIDE 40 MEQ: 1500 TABLET, EXTENDED RELEASE ORAL at 22:12

## 2024-04-06 RX ADMIN — HYDROCORTISONE 1 APPLICATION: 1 OINTMENT TOPICAL at 22:13

## 2024-04-06 RX ADMIN — Medication 10 ML: at 22:16

## 2024-04-06 RX ADMIN — HEPARIN SODIUM 4100 UNITS: 1000 INJECTION INTRAVENOUS; SUBCUTANEOUS at 22:03

## 2024-04-06 RX ADMIN — HEPARIN SODIUM 18 UNITS/KG/HR: 10000 INJECTION, SOLUTION INTRAVENOUS at 22:04

## 2024-04-06 RX ADMIN — IOPAMIDOL 90 ML: 612 INJECTION, SOLUTION INTRAVENOUS at 18:00

## 2024-04-06 NOTE — ED PROVIDER NOTES
"Subjective  History of Present Illness:    Chief Complaint: Weakness, right upper quadrant abdominal pain  History of Present Illness: 43-year-old female presents with weakness, right upper quadrant abdominal pain.  She states she has had right upper quadrant abdominal pain, weight loss, and weakness.  She also reports that she has had a recent rash on her neck and groin area that is started within the last 1 to 2 days.  Seen by her primary care physician several days ago, had labs that were unremarkable, she has not had any x-ray CT scan ultrasounds performed.  She does get very nauseated when she eats.  Onset: Gradual onset  Duration: Several days  Exacerbating / Alleviating factors: Nausea with eating  Associated symptoms: Weakness      Nurses Notes reviewed and agree, including vitals, allergies, social history and prior medical history.     REVIEW OF SYSTEMS: All systems reviewed and not pertinent unless noted.    Review of Systems   Gastrointestinal:  Positive for abdominal pain and nausea.   All other systems reviewed and are negative.      Past Medical History:   Diagnosis Date    Migraine     Pneumonia        Allergies:    Vancomycin      Past Surgical History:   Procedure Laterality Date    DISC REMOVAL      lumbar    HYSTERECTOMY           Social History     Socioeconomic History    Marital status:    Tobacco Use    Smoking status: Never    Smokeless tobacco: Never   Vaping Use    Vaping status: Never Used   Substance and Sexual Activity    Alcohol use: Never    Drug use: Never    Sexual activity: Defer         Family History   Problem Relation Age of Onset    Heart attack Mother     Heart disease Father     Lung disease Father     Diabetes Sister     Diabetes Brother        Objective  Physical Exam:  /62 (BP Location: Left arm, Patient Position: Sitting)   Pulse 70   Temp 97.8 °F (36.6 °C) (Oral)   Resp 14   Ht 175.3 cm (69.02\")   Wt 51.9 kg (114 lb 6 oz)   LMP  (LMP Unknown)   SpO2 " 100%   BMI 16.88 kg/m²      Physical Exam  Vitals and nursing note reviewed.   Constitutional:       Appearance: She is well-developed.   HENT:      Head: Normocephalic and atraumatic.      Mouth/Throat:      Mouth: Mucous membranes are moist.   Cardiovascular:      Rate and Rhythm: Normal rate and regular rhythm.   Pulmonary:      Effort: Pulmonary effort is normal.      Breath sounds: Normal breath sounds.   Abdominal:      Palpations: Abdomen is soft.      Tenderness: There is abdominal tenderness.      Comments: Upper quadrant tenderness to palpation   Musculoskeletal:         General: Normal range of motion.      Cervical back: Normal range of motion and neck supple.   Skin:     General: Skin is warm and dry.   Neurological:      Mental Status: She is alert and oriented to person, place, and time.      Deep Tendon Reflexes: Reflexes are normal and symmetric.           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    Critical care was time spent personally by me on the following activities:  Blood draw for specimens, development of treatment plan with patient or surrogate, discussions with consultants, discussions with primary provider, evaluation of patient's response to treatment, examination of patient, interpretation of cardiac output measurements, obtaining history from patient or surrogate, ordering and performing treatments and interventions, ordering and review of laboratory studies, ordering and review of radiographic studies, pulse oximetry, re-evaluation of patient's condition and  review of old charts    Care discussed with: admitting provider        ED Course:    ED Course as of 04/06/24 2306   Sat Apr 06, 2024   1844 I discussed Mrs. Florence's care with LISA Ross.  I have reviewed her data and prior records.  Have reviewed up-to-date.  He has consulted GYN and will await their recommendations. [DT]   1850 CT scan shows gonadal vein thrombus, discussed the case with Dr. Finley, OB/GYN, she felt that this would likely need to be anticoagulated, she has not had experience with gonadal vein thrombus, would recommend speaking to vascular also discussed with my supervising physician Dr. Heller, we agree [CS]   1909 Discussed the case with vascular, Dr. Gonzalez, he recommended going with OB/GYN plan, he has not seen a gonadal vein and his career he stated. [CS]   1909 Discussed the case with Dr. Escobar, she agreed keep the patient for admission, and agreed with anticoagulation, she would manage further care during hospitalization. [CS]   1910 Updated the patient family at the bedside, patient will be admitted for further care [CS]      ED Course User Index  [CS] Philippe Ross Jr., PA-C  [DT] Shaka Heller MD       Lab Results (last 24 hours)       Procedure Component Value Units Date/Time    Urinalysis With Microscopic If Indicated (No Culture) - Urine, Clean Catch [[id number]]  (Abnormal) Collected: 04/06/24 1627    Specimen: Urine, Clean Catch Updated: 04/06/24 1646     Color, UA Yellow     Appearance, UA Cloudy     pH, UA 5.5     Specific Gravity, UA 1.020     Glucose, UA Negative     Ketones, UA Negative     Bilirubin, UA Negative     Blood, UA Negative     Protein, UA Negative     Leuk Esterase, UA Negative     Nitrite, UA Negative     Urobilinogen, UA 0.2 E.U./dL    Urinalysis, Microscopic Only - Urine, Clean Catch [[id number]]  (Abnormal) Collected: 04/06/24 1627    Specimen: Urine, Clean Catch Updated: 04/06/24 1646     RBC, UA 0-2 /HPF      WBC, UA 0-2 /HPF      Bacteria,  UA Trace /HPF      Squamous Epithelial Cells, UA 3-6 /HPF      Hyaline Casts, UA 0-6 /LPF      Methodology Automated Microscopy    CBC & Differential [[id number]]  (Abnormal) Collected: 04/06/24 1628    Specimen: Blood Updated: 04/06/24 1641    Narrative:      The following orders were created for panel order CBC & Differential.  Procedure                               Abnormality         Status                     ---------                               -----------         ------                     CBC Auto Differential[[id number]]        Abnormal            Final result                 Please view results for these tests on the individual orders.    Comprehensive Metabolic Panel [[id number]]  (Abnormal) Collected: 04/06/24 1628    Specimen: Blood Updated: 04/06/24 1701     Glucose 102 mg/dL      BUN 15 mg/dL      Creatinine 0.80 mg/dL      Sodium 141 mmol/L      Potassium 3.4 mmol/L      Chloride 107 mmol/L      CO2 25.0 mmol/L      Calcium 9.1 mg/dL      Total Protein 7.4 g/dL      Albumin 4.7 g/dL      ALT (SGPT) 9 U/L      AST (SGOT) 17 U/L      Alkaline Phosphatase 49 U/L      Total Bilirubin 0.5 mg/dL      Globulin 2.7 gm/dL      Comment: Calculated Result        A/G Ratio 1.7 g/dL      BUN/Creatinine Ratio 18.8     Anion Gap 9.0 mmol/L      eGFR 93.9 mL/min/1.73     Narrative:      GFR Normal >60  Chronic Kidney Disease <60  Kidney Failure <15      Single High Sensitivity Troponin T [[id number]]  (Normal) Collected: 04/06/24 1628    Specimen: Blood Updated: 04/06/24 1706     HS Troponin T <6 ng/L     Narrative:      High Sensitive Troponin T Reference Range:  <14.0 ng/L- Negative Female for AMI  <22.0 ng/L- Negative Male for AMI  >=14 - Abnormal Female indicating possible myocardial injury.  >=22 - Abnormal Male indicating possible myocardial injury.   Clinicians would have to utilize clinical acumen, EKG, Troponin, and serial changes to determine if it is an Acute Myocardial Infarction or myocardial injury due  to an underlying chronic condition.         Magnesium [[id number]]  (Normal) Collected: 04/06/24 1628    Specimen: Blood Updated: 04/06/24 1701     Magnesium 2.0 mg/dL     CBC Auto Differential [[id number]]  (Abnormal) Collected: 04/06/24 1628    Specimen: Blood Updated: 04/06/24 1641     WBC 6.43 10*3/mm3      RBC 4.45 10*6/mm3      Hemoglobin 13.3 g/dL      Hematocrit 41.9 %      MCV 94.2 fL      MCH 29.9 pg      MCHC 31.7 g/dL      RDW 12.0 %      RDW-SD 41.7 fl      MPV 10.8 fL      Platelets 187 10*3/mm3      Neutrophil % 59.4 %      Lymphocyte % 31.4 %      Monocyte % 7.0 %      Eosinophil % 1.4 %      Basophil % 0.5 %      Immature Grans % 0.3 %      Neutrophils, Absolute 3.82 10*3/mm3      Lymphocytes, Absolute 2.02 10*3/mm3      Monocytes, Absolute 0.45 10*3/mm3      Eosinophils, Absolute 0.09 10*3/mm3      Basophils, Absolute 0.03 10*3/mm3      Immature Grans, Absolute 0.02 10*3/mm3      nRBC 0.0 /100 WBC     Protime-INR [[id number]]  (Normal) Collected: 04/06/24 1628    Specimen: Blood Updated: 04/06/24 1911     Protime 14.0 Seconds      INR 1.07    aPTT [[id number]]  (Abnormal) Collected: 04/06/24 1628    Specimen: Blood Updated: 04/06/24 1911     PTT 28.2 seconds     Narrative:      PTT = The equivalent PTT values for the therapeutic range of heparin levels at 0.3 to 0.5 U/ml are 60 to 70 seconds.    D-dimer, Quantitative [[id number]]  (Normal) Collected: 04/06/24 1628    Specimen: Blood Updated: 04/06/24 1911     D-Dimer, Quantitative 0.42 MCGFEU/mL     Narrative:      According to the assay 's published package insert, a normal (<0.50 MCGFEU/mL) D-dimer result in conjunction with a non-high clinical probability assessment, excludes deep vein thrombosis (DVT) and pulmonary embolism (PE) with high sensitivity.    D-dimer values increase with age and this can make VTE exclusion of an older population difficult. To address this, the [employer], based on best available evidence  "and recent guidelines, recommends that clinicians use age-adjusted D-dimer thresholds in patients greater than 50 years of age with: a) a low probability of PE who do not meet all Pulmonary Embolism Rule Out Criteria, or b) in those with intermediate probability of PE.   The formula for an age-adjusted D-dimer cut-off is \"age/100\".  For example, a 60 year old patient would have an age-adjusted cut-off of 0.60 MCGFEU/mL and an 80 year old 0.80 MCGFEU/mL.     [[id number]] Collected: 04/06/24 1628    Specimen: Blood Updated: 04/06/24 2029    CEA [[id number]] Collected: 04/06/24 1628    Specimen: Blood Updated: 04/06/24 2029    AFP Tumor Marker [[id number]] Collected: 04/06/24 1628    Specimen: Blood Updated: 04/06/24 2029    Sedimentation Rate [[id number]]  (Normal) Collected: 04/06/24 1628    Specimen: Blood Updated: 04/06/24 2039     Sed Rate 2 mm/hr     C-reactive Protein [[id number]]  (Normal) Collected: 04/06/24 1628    Specimen: Blood Updated: 04/06/24 2100     C-Reactive Protein <0.30 mg/dL     Heparin Anti-Xa [[id number]]  (Abnormal) Collected: 04/06/24 1922    Specimen: Blood Updated: 04/06/24 1945     Heparin Anti-Xa (UFH) 0.10 IU/ml     Factor 5 Leiden [[id number]] Collected: 04/06/24 2030    Specimen: Blood Updated: 04/06/24 2115    Anticardiolipin Antibody, IgG / M, Qn [[id number]] Collected: 04/06/24 2031    Specimen: Blood Updated: 04/06/24 2115    Antithrombin III [[id number]] Collected: 04/06/24 2031    Specimen: Blood Updated: 04/06/24 2115    Beta-2 Glycoprotein Antibodies [[id number]] Collected: 04/06/24 2031    Specimen: Blood Updated: 04/06/24 2115    Homocysteine [[id number]] Collected: 04/06/24 2031    Specimen: Blood Updated: 04/06/24 2115    Lupus Anticoagulant [[id number]] Collected: 04/06/24 2031    Specimen: Blood Updated: 04/06/24 2115    Protein C Activity [[id number]] Collected: 04/06/24 2031    Specimen: Blood Updated: 04/06/24 2115    Factor II, DNA Analysis [[id number]] Collected: 04/06/24 " 2031    Specimen: Blood Updated: 04/06/24 2115    Protein S Antigen, Free [[id number]] Collected: 04/06/24 2031    Specimen: Blood Updated: 04/06/24 2115    Protein S Antigen, Total [[id number]] Collected: 04/06/24 2031    Specimen: Blood Updated: 04/06/24 2115    Protein S Functional [[id number]] Collected: 04/06/24 2031    Specimen: Blood Updated: 04/06/24 2115    Protein C Antigen, Total [[id number]] Collected: 04/06/24 2031    Specimen: Blood Updated: 04/06/24 2115    Phosphatidylserine Antibodies [[id number]] Collected: 04/06/24 2031    Specimen: Blood Updated: 04/06/24 2115    Cancer Antigen 19-9 [[id number]] Collected: 04/06/24 2031    Specimen: Blood Updated: 04/06/24 2112    [facility] Comprehensive Panel [[id number]] Collected: 04/06/24 2031    Specimen: Blood Updated: 04/06/24 2115             US Gallbladder    Result Date: 4/6/2024  US GALLBLADDER Date of Exam: 4/6/2024 6:57 PM EDT Indication: ruq abd pain. Comparison: CT abdomen and pelvis with contrast 4/6/2024 Technique: Grayscale and color Doppler ultrasound evaluation of the right upper quadrant was performed. Findings: Visualized portions of the pancreatic parenchyma demonstrate normal echogenicity, bowel gas shadowing limits complete pancreatic parenchymal assessment. The background liver echogenicity is within normal limits for technique. No perihepatic ascites. There is hepatopetal flow in the portal vein. The visualized hepatic veins are patent. The gallbladder is present. Negative for cholelithiasis. No pericholecystic fluid. Normal caliber common bile duct measuring 5 mm. The right kidney measures 9.8 x 4.6 x 4.2 cm. No right-sided hydronephrosis.     Impression: Impression: Normal exam. Electronically Signed: Ignacio Ponce MD  4/6/2024 7:24 PM EDT  Workstation ID: [id number]    XR Chest 1 View    Result Date: 4/6/2024  XR CHEST 1 VW Date of Exam: 4/6/2024 6:18 PM EDT Indication: Weak/Dizzy/AMS triage protocol Comparison: 6/18/2023 Findings: The  cardiomediastinal silhouette is within normal limits. Lungs are clear. No focal consolidation, pneumothorax, or significant pleural effusion. Osseous structures grossly intact.     Impression: Impression: No acute process. Electronically Signed: Ignacio Ponce MD  4/6/2024 6:42 PM EDT  Workstation ID: [id number]    CT Abdomen Pelvis With Contrast    Result Date: 4/6/2024  CT ABDOMEN PELVIS W CONTRAST Date of Exam: 4/6/2024 5:32 PM EDT Indication: ruq abd pain. Comparison: None available. Technique: Axial CT images were obtained of the abdomen and pelvis following the uneventful intravenous administration of 90 cc Isovue-300 . Reconstructed coronal and sagittal images were also obtained. Automated exposure control and iterative construction methods were used. FINDINGS: Lung bases: No masses. No consolidation. Liver:No masses. No intrahepatic biliary ductal dilatation. Spleen:No masses. No perisplenic hematoma. Pancreas:No pancreatic masses. No evidence of pancreatitis. Gallbladder and common bile duct:No evidence of cholelithiasis. No evidence of cholecystitis. Adrenal glands:No adrenal masses Kidneys and ureters:No kidney stones. No renal masses.No calculi present within the ureters. Normal caliber ureters. Urinary bladder:No urinary bladder wall thickening. No bladder masses. Small bowel:Normal caliber small bowel. Large bowel: Extensive colonic diverticulosis without evidence of diverticulitis. Appendix: Not seen however there is no evidence of appendicitis GENITOURINARY: Right corpus luteum cyst. Ascites or pneumoperitoneum:None. Adenopathy:None present Osseous structures:Normal Other findings: Thrombosis within a focally dilated right gonadal vein. The gonadal vein measures up to 1.4 cm.     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 No surrounding adenopathy. Normal appearance of the gallbladder. No rib fractures. Electronically Signed: Palmer Boo MD  4/6/2024 6:12 PM EDT  Workstation ID:  [id number]        Medical Decision Making  Patient Presentation 43-year-old female presented with right-sided abdominal pain, and weight loss, initial assessment she was tender palpation in her right abdomen mild guarding    DDX, Differential would include biliary leak cholecystitis, cholelithiasis, cholangitis, colitis, diverticulitis, hepatic abscess, hepatic mass, possible right lower lobe pneumonia, nephrolithiasis, pyelonephritis       Data Review/ Non ED Records /Analysis/Ordering unique tests  Review of previous  non ED visits, prior labs, prior imaging, available notes from prior evaluations or visits with specialists, medication list, allergies, past medical history, past surgical history        Independent Review Studies  I Personally reviewed all laboratory studies performed in the emergency department     Intervention/Re-evaluation intervention included heparin bolus and drip    Independent Clinician discussed the case with the on-call OB/GYN Dr. Finley, discussed the case with the on-call vascular surgeon Dr. Gonzalez, discussed with my supervising physician Dr. Heller, discussed with the on-call hospitalist Dr. Escobar    Risk Stratification tools/clinical decision rules patient presented right-sided abdominal pain found to have a gonadal thrombus, significant concern for worsening symptoms including PE or thrombolytic stroke.  High Probability of sudden clinically significant, or life threatening deterioration in the patients condition requiring direct delivery of care and management,  and high complexity decision making acute intervention included heparin bolus and drip, and admission for further care    Shared Decision Making discussed this with patient and family they were agreeable    Disposition patient stable for admission    Problems Addressed:  Thrombus: complicated acute illness or injury    Amount and/or Complexity of Data Reviewed  External Data Reviewed: labs, radiology and notes.  Labs:  ordered. Decision-making details documented in ED Course.  Radiology: ordered.    Risk  Prescription drug management.  Decision regarding hospitalization.          Final diagnoses:   Thrombus           Disposition admission       Philippe Ross Jr., PA-C  04/06/24 7125

## 2024-04-06 NOTE — H&P
[facility] Medicine Services  HISTORY AND PHYSICAL    Patient Name: Radha Florence  : 1980  MRN: [id number]  Primary Care Physician: Chantel Baker MD  Date of admission: 2024      Subjective   Subjective     Chief Complaint:  Abdominal Pain    HPI:  Radha Florence is a 43 y.o. female with PMHx significant for migraines, AUB and LGSIL pap s/p YOLA 2022 at  who presents to [facility] with vague abdominal pain and RUQ back pain. Patient notes she has been feeling unwell for a couple weeks. She reports fatigue, malaise, nausea, vague right sided abdominal pain in both her back and under her rib cage. She denies macy recent travel, no recent surgical procedures. No hx of blood clots, no family hx of clotting disorders that she is aware of. 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 She notes some weight loss and weakness. She went to her PCP several days ago and was given zofran as she continues to have some nausea with eating. She also notes scaly red rash that developed under her right armpit and around her groin over the last couple days. CT scan in the ER showed right gonadal vein thrombus.      Personal History     Past Medical History:   Diagnosis Date    Migraine     Pneumonia            Past Surgical History:   Procedure Laterality Date    DISC REMOVAL      lumbar    HYSTERECTOMY         Family History: family history includes Diabetes in her brother and sister; Heart attack in her mother; Heart disease in her father; Lung disease in her father.     Social History:  reports that she has never smoked. She has never used smokeless tobacco. She reports that she does not drink alcohol and does not use drugs.  Social History     Social History Narrative    Not on file       Medications:  Available home medication information reviewed.  DULoxetine, HYDROcodone-acetaminophen, Psyllium, SUMAtriptan, albuterol sulfate HFA, gabapentin, lidocaine,  predniSONE, promethazine, tiZANidine, and topiramate    Allergies   Allergen Reactions    Vancomycin Other (See Comments)     Vancomycin infusion reaction after completion of administration; tolerated w/ slowed infusion rate  Red Man syndrome - not an allergy        Objective   Objective     Vital Signs:   Temp:  [97.8 °F (36.6 °C)] 97.8 °F (36.6 °C)  Heart Rate:  [72] 72  Resp:  [16] 16  BP: (143)/(85) 143/85       Physical Exam   Constitutional: Awake, alert  Eyes: PERRLA, sclerae anicteric, no conjunctival injection  HENT: NCAT, mucous membranes moist  Neck: Supple, no thyromegaly, no lymphadenopathy, trachea midline  Respiratory: Clear to auscultation bilaterally, nonlabored respirations   Cardiovascular: RRR, no murmurs, rubs, or gallops, palpable pedal pulses bilaterally  Gastrointestinal: Positive bowel sounds, soft, nontender, nondistended  Musculoskeletal: No bilateral ankle edema, no clubbing or cyanosis to extremities  Psychiatric: Appropriate affect, cooperative  Neurologic: Oriented x 3, strength symmetric in all extremities, Cranial Nerves grossly intact to confrontation, speech clear  Skin: scaly eczematous appearing rash in right armpit, no raised patches      Result Review:  I have personally reviewed the results from the time of this admission to 4/6/2024 19:53 EDT and agree with these findings:  [x]  Laboratory list / accordion  [x]  Microbiology  [x]  Radiology  [x]  EKG/Telemetry   [x]  Cardiology/Vascular   [x]  Pathology  [x]  Old records  []  Other  Most notable findings include:       LAB RESULTS:      Lab 04/06/24  1922 04/06/24  1628 04/01/24  1534   WBC  --  6.43 5.03   HEMOGLOBIN  --  13.3 13.6   HEMATOCRIT  --  41.9 42.7   PLATELETS  --  187 151*   NEUTROS ABS  --  3.82 2.97   IMMATURE GRANS (ABS)  --  0.02 0.01   LYMPHS ABS  --  2.02 1.57   MONOS ABS  --  0.45 0.37   EOS ABS  --  0.09 0.09   MCV  --  94.2 96   PROTIME  --  14.0  --    INR  --  1.07  --    APTT  --  28.2*  --    HEPARIN  ANTI-XA 0.10*  --   --    D DIMER QUANT  --  0.42  --          Lab 04/06/24  1628 04/01/24  1534   SODIUM 141  --    POTASSIUM 3.4*  --    CHLORIDE 107  --    CO2 25.0  --    ANION GAP 9.0  --    BUN 15  --    CREATININE 0.80  --    EGFR 93.9  --    GLUCOSE 102*  --    CALCIUM 9.1  --    MAGNESIUM 2.0 2.0         Lab 04/06/24  1628   TOTAL PROTEIN 7.4   ALBUMIN 4.7   GLOBULIN 2.7   ALT (SGPT) 9   AST (SGOT) 17   BILIRUBIN 0.5   ALK PHOS 49         Lab 04/06/24  1628   HSTROP T <6             Lab 04/01/24  1534   IRON 104   IRON SATURATION 30   TIBC 348   TRANSFERRIN 278   FERRITIN 50   VITAMIN B 12 >2,000*         UA          4/6/2024    16:27   Urinalysis   Squamous Epithelial Cells, UA 3-6    Specific Gravity, UA 1.020    Ketones, UA Negative    Blood, UA Negative    Leukocytes, UA Negative    Nitrite, UA Negative    RBC, UA 0-2    WBC, UA 0-2    Bacteria, UA Trace        Microbiology Results (last 10 days)       ** No results found for the last 240 hours. **            US Gallbladder    Result Date: 4/6/2024  US GALLBLADDER Date of Exam: 4/6/2024 6:57 PM EDT Indication: ruq abd pain. Comparison: CT abdomen and pelvis with contrast 4/6/2024 Technique: Grayscale and color Doppler ultrasound evaluation of the right upper quadrant was performed. Findings: Visualized portions of the pancreatic parenchyma demonstrate normal echogenicity, bowel gas shadowing limits complete pancreatic parenchymal assessment. The background liver echogenicity is within normal limits for technique. No perihepatic ascites. There is hepatopetal flow in the portal vein. The visualized hepatic veins are patent. The gallbladder is present. Negative for cholelithiasis. No pericholecystic fluid. Normal caliber common bile duct measuring 5 mm. The right kidney measures 9.8 x 4.6 x 4.2 cm. No right-sided hydronephrosis.     Impression: Impression: Normal exam. Electronically Signed: Ignacio Ponce MD  4/6/2024 7:24 PM EDT  Workstation ID:  [id number]    XR Chest 1 View    Result Date: 4/6/2024  XR CHEST 1 VW Date of Exam: 4/6/2024 6:18 PM EDT Indication: Weak/Dizzy/AMS triage protocol Comparison: 6/18/2023 Findings: The cardiomediastinal silhouette is within normal limits. Lungs are clear. No focal consolidation, pneumothorax, or significant pleural effusion. Osseous structures grossly intact.     Impression: Impression: No acute process. Electronically Signed: Ignacio Ponce MD  4/6/2024 6:42 PM EDT  Workstation ID: [id number]    CT Abdomen Pelvis With Contrast    Result Date: 4/6/2024  CT ABDOMEN PELVIS W CONTRAST Date of Exam: 4/6/2024 5:32 PM EDT Indication: ruq abd pain. Comparison: None available. Technique: Axial CT images were obtained of the abdomen and pelvis following the uneventful intravenous administration of 90 cc Isovue-300 . Reconstructed coronal and sagittal images were also obtained. Automated exposure control and iterative construction methods were used. FINDINGS: Lung bases: No masses. No consolidation. Liver:No masses. No intrahepatic biliary ductal dilatation. Spleen:No masses. No perisplenic hematoma. Pancreas:No pancreatic masses. No evidence of pancreatitis. Gallbladder and common bile duct:No evidence of cholelithiasis. No evidence of cholecystitis. Adrenal glands:No adrenal masses Kidneys and ureters:No kidney stones. No renal masses.No calculi present within the ureters. Normal caliber ureters. Urinary bladder:No urinary bladder wall thickening. No bladder masses. Small bowel:Normal caliber small bowel. Large bowel: Extensive colonic diverticulosis without evidence of diverticulitis. Appendix: Not seen however there is no evidence of appendicitis GENITOURINARY: Right corpus luteum cyst. Ascites or pneumoperitoneum:None. Adenopathy:None present Osseous structures:Normal Other findings: Thrombosis within a focally dilated right gonadal vein. The gonadal vein measures up to 1.4 cm.     Impression: Thrombosis within a dilated right  gonadal vein. No surrounding adenopathy. Normal appearance of the gallbladder. No rib fractures. Electronically Signed: Palmer Boo MD  4/6/2024 6:12 PM EDT  Workstation ID: [id number]         Assessment & Plan   Assessment & Plan       Gonadal Vein Thrombosis    Radha Flroence is a 43 y.o. 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    R Gonadal Vein Thrombosis:  AUB, LGSIL on Pap s/p YOLA 12/22/2022 @ :  - rare incidental finding, currently with unclear cause  - CT abd/pelvis, GB ultrasound, UA and CXR are otherwise normal  - check bilateral LE duplex, D-dimer is negative, but consider CTA to r/o PE  - no recent travel, no recent surgical procedures  - start heparin gtt, can likely transition to PO Eliquis in the AM  - start broad spectrum work-up to include:   - tumor marks AFP, CEA, ,    - check hypercoagulable panel   - check GC/Chlamydia, hold off on further infectious work-up as she has no fever or WBC   - check COVID given recent complaints of malaise, risk factor for hypercoagulable state   - check KENYETTA w/reflex  - Consult OB/Gyn and Hematology in the AM. ED provider did discuss w/vascular team who was not familiar w/this diagnosis  - unclear association of rash, as above consider rheum involvement if KENYETTA panel positive, will use topical hydrocortisone for now    Hx Migraines    DVT prophylaxis:  Medical DVT prophylaxis orders are present.    CODE STATUS:    Code Status and Medical Interventions:   Ordered at: 04/06/24 1951     Level Of Support Discussed With:    Patient     Code Status (Patient has no pulse and is not breathing):    CPR (Attempt to Resuscitate)     Medical Interventions (Patient has pulse or is breathing):    Full Support       Expected Discharge   Expected discharge date/ time has not been documented.     Ruth Gutierrez,  DO  04/06/24

## 2024-04-06 NOTE — ED NOTES
" Radha Florence    Nursing Report ED to Floor:  Mental status: A/Ox4  Ambulatory status: Standby  Oxygen Therapy:  RA  Cardiac Rhythm: NSR  Admitted from: ED- Home   Safety Concerns:  NA  Social Issues: NA  ED Room #:  11    ED Nurse Phone Extension - [phone] or may call [phone].      HPI:   Chief Complaint   Patient presents with    Weakness - Generalized       Past Medical History:  Past Medical History:   Diagnosis Date    Migraine     Pneumonia         Past Surgical History:  Past Surgical History:   Procedure Laterality Date    DISC REMOVAL      lumbar    HYSTERECTOMY          Admitting Doctor:   Ruth Gutierrez DO    Consulting Provider(s):  Consults       No orders found from 3/8/2024 to 4/7/2024.             Admitting Diagnosis:   The encounter diagnosis was Thrombus.    Most Recent Vitals:   Vitals:    04/06/24 1614   BP: 143/85   BP Location: Left arm   Patient Position: Sitting   Pulse: 72   Resp: 16   Temp: 97.8 °F (36.6 °C)   TempSrc: Oral   SpO2: 100%   Weight: 51.3 kg (113 lb)   Height: 175.3 cm (69\")       Active LDAs/IV Access:   Lines, Drains & Airways       Active LDAs       Name Placement date Placement time Site Days    Peripheral IV 04/06/24 1628 Left Antecubital 04/06/24  1628  Antecubital  less than 1                    Labs (abnormal labs have a star):   Labs Reviewed   COMPREHENSIVE METABOLIC PANEL - Abnormal; Notable for the following components:       Result Value    Glucose 102 (*)     Potassium 3.4 (*)     All other components within normal limits    Narrative:     GFR Normal >60  Chronic Kidney Disease <60  Kidney Failure <15     URINALYSIS W/ MICROSCOPIC IF INDICATED (NO CULTURE) - Abnormal; Notable for the following components:    Appearance, UA Cloudy (*)     All other components within normal limits   CBC WITH AUTO DIFFERENTIAL - Abnormal; Notable for the following components:    RDW 12.0 (*)     All other components within normal limits   URINALYSIS, MICROSCOPIC ONLY - Abnormal; " "Notable for the following components:    Squamous Epithelial Cells, UA 3-6 (*)     All other components within normal limits   APTT - Abnormal; Notable for the following components:    PTT 28.2 (*)     All other components within normal limits    Narrative:     PTT = The equivalent PTT values for the therapeutic range of heparin levels at 0.3 to 0.5 U/ml are 60 to 70 seconds.   SINGLE HS TROPONIN T - Normal    Narrative:     High Sensitive Troponin T Reference Range:  <14.0 ng/L- Negative Female for AMI  <22.0 ng/L- Negative Male for AMI  >=14 - Abnormal Female indicating possible myocardial injury.  >=22 - Abnormal Male indicating possible myocardial injury.   Clinicians would have to utilize clinical acumen, EKG, Troponin, and serial changes to determine if it is an Acute Myocardial Infarction or myocardial injury due to an underlying chronic condition.        MAGNESIUM - Normal   PROTIME-INR - Normal   D-DIMER, QUANTITATIVE - Normal    Narrative:     According to the assay 's published package insert, a normal (<0.50 MCGFEU/mL) D-dimer result in conjunction with a non-high clinical probability assessment, excludes deep vein thrombosis (DVT) and pulmonary embolism (PE) with high sensitivity.    D-dimer values increase with age and this can make VTE exclusion of an older population difficult. To address this, the American College [employer], based on best available evidence and recent guidelines, recommends that clinicians use age-adjusted D-dimer thresholds in patients greater than 50 years of age with: a) a low probability of PE who do not meet all Pulmonary Embolism Rule Out Criteria, or b) in those with intermediate probability of PE.   The formula for an age-adjusted D-dimer cut-off is \"age/100\".  For example, a 60 year old patient would have an age-adjusted cut-off of 0.60 MCGFEU/mL and an 80 year old 0.80 MCGFEU/mL.   RAINBOW DRAW    Narrative:     The following orders were created for panel " order [place] Draw.  Procedure                               Abnormality         Status                     ---------                               -----------         ------                     Green Top (Gel)[[id number]]                                  Final result               Lavender Top[[id number]]                                     Final result               Gold Top - SST[[id number]]                                   Final result               Gray Top[[id number]]                                         In process                 Light Blue Top[[id number]]                                   Final result                 Please view results for these tests on the individual orders.   HEPARIN ANTI XA   CBC AND DIFFERENTIAL    Narrative:     The following orders were created for panel order CBC & Differential.  Procedure                               Abnormality         Status                     ---------                               -----------         ------                     CBC Auto Differential[[id number]]        Abnormal            Final result                 Please view results for these tests on the individual orders.   GREEN TOP   LAVENDER TOP   GOLD TOP - SST   LIGHT BLUE TOP   GRAY TOP       Meds Given in ED:   Medications   sodium chloride 0.9 % flush 10 mL (has no administration in time range)   heparin (porcine) injection 4,100 Units (has no administration in time range)   heparin 63265 units/250 mL (100 units/mL) in 0.45 % NaCl infusion (has no administration in time range)   Pharmacy to Dose Heparin (has no administration in time range)   iopamidol (ISOVUE-300) 61 % injection 100 mL (90 mL Intravenous Given 4/6/24 1800)     heparin, 18 Units/kg/hr  Pharmacy to Dose Heparin,          Last NIH score:                                                          Dysphagia screening results:        Albaro Coma Scale:  No data recorded     CIWA:        Restraint Type:            Isolation Status:  No  active isolations

## 2024-04-07 ENCOUNTER — APPOINTMENT (OUTPATIENT)
Dept: CARDIOLOGY | Facility: HOSPITAL | Age: 44
End: 2024-04-07
Payer: COMMERCIAL

## 2024-04-07 LAB
ALPHA-FETOPROTEIN: <2 NG/ML (ref 0–8.3)
ANION GAP SERPL CALCULATED.3IONS-SCNC: 9 MMOL/L (ref 5–15)
BASOPHILS # BLD AUTO: 0.03 10*3/MM3 (ref 0–0.2)
BASOPHILS NFR BLD AUTO: 0.6 % (ref 0–1.5)
BH CV LOWER VASCULAR LEFT COMMON FEMORAL AUGMENT: NORMAL
BH CV LOWER VASCULAR LEFT COMMON FEMORAL COMPETENT: NORMAL
BH CV LOWER VASCULAR LEFT COMMON FEMORAL COMPRESS: NORMAL
BH CV LOWER VASCULAR LEFT COMMON FEMORAL PHASIC: NORMAL
BH CV LOWER VASCULAR LEFT COMMON FEMORAL SPONT: NORMAL
BH CV LOWER VASCULAR LEFT DISTAL FEMORAL COMPRESS: NORMAL
BH CV LOWER VASCULAR LEFT GASTRONEMIUS COMPRESS: NORMAL
BH CV LOWER VASCULAR LEFT GREATER SAPH AK COMPRESS: NORMAL
BH CV LOWER VASCULAR LEFT GREATER SAPH BK COMPRESS: NORMAL
BH CV LOWER VASCULAR LEFT LESSER SAPH COMPRESS: NORMAL
BH CV LOWER VASCULAR LEFT MID FEMORAL AUGMENT: NORMAL
BH CV LOWER VASCULAR LEFT MID FEMORAL COMPETENT: NORMAL
BH CV LOWER VASCULAR LEFT MID FEMORAL COMPRESS: NORMAL
BH CV LOWER VASCULAR LEFT MID FEMORAL PHASIC: NORMAL
BH CV LOWER VASCULAR LEFT MID FEMORAL SPONT: NORMAL
BH CV LOWER VASCULAR LEFT PERONEAL COMPRESS: NORMAL
BH CV LOWER VASCULAR LEFT POPLITEAL AUGMENT: NORMAL
BH CV LOWER VASCULAR LEFT POPLITEAL COMPETENT: NORMAL
BH CV LOWER VASCULAR LEFT POPLITEAL COMPRESS: NORMAL
BH CV LOWER VASCULAR LEFT POPLITEAL PHASIC: NORMAL
BH CV LOWER VASCULAR LEFT POPLITEAL SPONT: NORMAL
BH CV LOWER VASCULAR LEFT POSTERIOR TIBIAL COMPRESS: NORMAL
BH CV LOWER VASCULAR LEFT PROXIMAL FEMORAL COMPRESS: NORMAL
BH CV LOWER VASCULAR LEFT SAPHENOFEMORAL JUNCTION COMPRESS: NORMAL
BH CV LOWER VASCULAR RIGHT COMMON FEMORAL AUGMENT: NORMAL
BH CV LOWER VASCULAR RIGHT COMMON FEMORAL COMPETENT: NORMAL
BH CV LOWER VASCULAR RIGHT COMMON FEMORAL COMPRESS: NORMAL
BH CV LOWER VASCULAR RIGHT COMMON FEMORAL PHASIC: NORMAL
BH CV LOWER VASCULAR RIGHT COMMON FEMORAL SPONT: NORMAL
BH CV LOWER VASCULAR RIGHT DISTAL FEMORAL COMPRESS: NORMAL
BH CV LOWER VASCULAR RIGHT GASTRONEMIUS COMPRESS: NORMAL
BH CV LOWER VASCULAR RIGHT GREATER SAPH AK COMPRESS: NORMAL
BH CV LOWER VASCULAR RIGHT GREATER SAPH BK COMPRESS: NORMAL
BH CV LOWER VASCULAR RIGHT LESSER SAPH COMPRESS: NORMAL
BH CV LOWER VASCULAR RIGHT MID FEMORAL AUGMENT: NORMAL
BH CV LOWER VASCULAR RIGHT MID FEMORAL COMPETENT: NORMAL
BH CV LOWER VASCULAR RIGHT MID FEMORAL COMPRESS: NORMAL
BH CV LOWER VASCULAR RIGHT MID FEMORAL PHASIC: NORMAL
BH CV LOWER VASCULAR RIGHT MID FEMORAL SPONT: NORMAL
BH CV LOWER VASCULAR RIGHT PERONEAL COMPRESS: NORMAL
BH CV LOWER VASCULAR RIGHT POPLITEAL AUGMENT: NORMAL
BH CV LOWER VASCULAR RIGHT POPLITEAL COMPETENT: NORMAL
BH CV LOWER VASCULAR RIGHT POPLITEAL COMPRESS: NORMAL
BH CV LOWER VASCULAR RIGHT POPLITEAL PHASIC: NORMAL
BH CV LOWER VASCULAR RIGHT POPLITEAL SPONT: NORMAL
BH CV LOWER VASCULAR RIGHT POSTERIOR TIBIAL COMPRESS: NORMAL
BH CV LOWER VASCULAR RIGHT PROXIMAL FEMORAL COMPRESS: NORMAL
BH CV LOWER VASCULAR RIGHT SAPHENOFEMORAL JUNCTION COMPRESS: NORMAL
BUN SERPL-MCNC: 13 MG/DL (ref 6–20)
BUN/CREAT SERPL: 17.6 (ref 7–25)
CALCIUM SPEC-SCNC: 8.3 MG/DL (ref 8.6–10.5)
CANCER AG125 SERPL QL: 18.2 U/ML (ref 0–38.1)
CANCER AG19-9 SERPL-ACNC: 14.6 U/ML
CEA SERPL-MCNC: 2.51 NG/ML
CHLORIDE SERPL-SCNC: 112 MMOL/L (ref 98–107)
CO2 SERPL-SCNC: 21 MMOL/L (ref 22–29)
CREAT SERPL-MCNC: 0.74 MG/DL (ref 0.57–1)
DEPRECATED RDW RBC AUTO: 42.4 FL (ref 37–54)
EGFRCR SERPLBLD CKD-EPI 2021: 103.1 ML/MIN/1.73
EOSINOPHIL # BLD AUTO: 0.15 10*3/MM3 (ref 0–0.4)
EOSINOPHIL NFR BLD AUTO: 3.2 % (ref 0.3–6.2)
ERYTHROCYTE [DISTWIDTH] IN BLOOD BY AUTOMATED COUNT: 12.1 % (ref 12.3–15.4)
FLUAV SUBTYP SPEC NAA+PROBE: NOT DETECTED
FLUBV RNA ISLT QL NAA+PROBE: NOT DETECTED
GLUCOSE SERPL-MCNC: 80 MG/DL (ref 65–99)
HCT VFR BLD AUTO: 38.3 % (ref 34–46.6)
HCYS SERPL-MCNC: 9.6 UMOL/L (ref 0–15)
HGB BLD-MCNC: 12.1 G/DL (ref 12–15.9)
IMM GRANULOCYTES # BLD AUTO: 0.01 10*3/MM3 (ref 0–0.05)
IMM GRANULOCYTES NFR BLD AUTO: 0.2 % (ref 0–0.5)
LYMPHOCYTES # BLD AUTO: 1.72 10*3/MM3 (ref 0.7–3.1)
LYMPHOCYTES NFR BLD AUTO: 37.1 % (ref 19.6–45.3)
MCH RBC QN AUTO: 30.4 PG (ref 26.6–33)
MCHC RBC AUTO-ENTMCNC: 31.6 G/DL (ref 31.5–35.7)
MCV RBC AUTO: 96.2 FL (ref 79–97)
MONOCYTES # BLD AUTO: 0.41 10*3/MM3 (ref 0.1–0.9)
MONOCYTES NFR BLD AUTO: 8.8 % (ref 5–12)
NEUTROPHILS NFR BLD AUTO: 2.32 10*3/MM3 (ref 1.7–7)
NEUTROPHILS NFR BLD AUTO: 50.1 % (ref 42.7–76)
NRBC BLD AUTO-RTO: 0 /100 WBC (ref 0–0.2)
PLATELET # BLD AUTO: 147 10*3/MM3 (ref 140–450)
PMV BLD AUTO: 11.4 FL (ref 6–12)
POTASSIUM SERPL-SCNC: 5 MMOL/L (ref 3.5–5.2)
RBC # BLD AUTO: 3.98 10*6/MM3 (ref 3.77–5.28)
SARS-COV-2 RNA RESP QL NAA+PROBE: NOT DETECTED
SODIUM SERPL-SCNC: 142 MMOL/L (ref 136–145)
UFH PPP CHRO-ACNC: 0.43 IU/ML (ref 0.3–0.7)
UFH PPP CHRO-ACNC: 0.51 IU/ML (ref 0.3–0.7)
UFH PPP CHRO-ACNC: 0.53 IU/ML (ref 0.3–0.7)
WBC NRBC COR # BLD AUTO: 4.64 10*3/MM3 (ref 3.4–10.8)

## 2024-04-07 PROCEDURE — 92610 EVALUATE SWALLOWING FUNCTION: CPT

## 2024-04-07 PROCEDURE — 99204 OFFICE O/P NEW MOD 45 MIN: CPT | Performed by: INTERNAL MEDICINE

## 2024-04-07 PROCEDURE — 80048 BASIC METABOLIC PNL TOTAL CA: CPT | Performed by: INTERNAL MEDICINE

## 2024-04-07 PROCEDURE — G0378 HOSPITAL OBSERVATION PER HR: HCPCS

## 2024-04-07 PROCEDURE — 96376 TX/PRO/DX INJ SAME DRUG ADON: CPT

## 2024-04-07 PROCEDURE — 85520 HEPARIN ASSAY: CPT

## 2024-04-07 PROCEDURE — 25010000002 PIPERACILLIN SOD-TAZOBACTAM PER 1 G: Performed by: OBSTETRICS & GYNECOLOGY

## 2024-04-07 PROCEDURE — 93970 EXTREMITY STUDY: CPT | Performed by: INTERNAL MEDICINE

## 2024-04-07 PROCEDURE — 25010000002 HEPARIN (PORCINE) 25000-0.45 UT/250ML-% SOLUTION: Performed by: INTERNAL MEDICINE

## 2024-04-07 PROCEDURE — 96368 THER/DIAG CONCURRENT INF: CPT

## 2024-04-07 PROCEDURE — 25010000002 DIPHENHYDRAMINE PER 50 MG: Performed by: HOSPITALIST

## 2024-04-07 PROCEDURE — 85025 COMPLETE CBC W/AUTO DIFF WBC: CPT | Performed by: INTERNAL MEDICINE

## 2024-04-07 PROCEDURE — 96366 THER/PROPH/DIAG IV INF ADDON: CPT

## 2024-04-07 PROCEDURE — 99232 SBSQ HOSP IP/OBS MODERATE 35: CPT | Performed by: HOSPITALIST

## 2024-04-07 PROCEDURE — 96375 TX/PRO/DX INJ NEW DRUG ADDON: CPT

## 2024-04-07 PROCEDURE — 25010000002 ONDANSETRON PER 1 MG: Performed by: INTERNAL MEDICINE

## 2024-04-07 PROCEDURE — 93970 EXTREMITY STUDY: CPT

## 2024-04-07 PROCEDURE — 87636 SARSCOV2 & INF A&B AMP PRB: CPT | Performed by: INTERNAL MEDICINE

## 2024-04-07 RX ORDER — SUMATRIPTAN 50 MG/1
50 TABLET, FILM COATED ORAL
Status: DISCONTINUED | OUTPATIENT
Start: 2024-04-07 | End: 2024-04-08 | Stop reason: HOSPADM

## 2024-04-07 RX ORDER — TOPIRAMATE SPINKLE 25 MG/1
100 CAPSULE ORAL DAILY
Status: DISCONTINUED | OUTPATIENT
Start: 2024-04-07 | End: 2024-04-08 | Stop reason: HOSPADM

## 2024-04-07 RX ORDER — DIPHENHYDRAMINE HYDROCHLORIDE 50 MG/ML
12.5 INJECTION INTRAMUSCULAR; INTRAVENOUS EVERY 6 HOURS PRN
Status: DISCONTINUED | OUTPATIENT
Start: 2024-04-07 | End: 2024-04-08 | Stop reason: HOSPADM

## 2024-04-07 RX ADMIN — PIPERACILLIN AND TAZOBACTAM 4.5 G: 4; .5 INJECTION, POWDER, FOR SOLUTION INTRAVENOUS at 11:33

## 2024-04-07 RX ADMIN — HYDROCORTISONE 1 APPLICATION: 1 OINTMENT TOPICAL at 21:53

## 2024-04-07 RX ADMIN — GABAPENTIN 800 MG: 400 CAPSULE ORAL at 07:51

## 2024-04-07 RX ADMIN — DIPHENHYDRAMINE HYDROCHLORIDE 12.5 MG: 50 INJECTION INTRAMUSCULAR; INTRAVENOUS at 21:53

## 2024-04-07 RX ADMIN — SUMATRIPTAN SUCCINATE 50 MG: 50 TABLET ORAL at 22:06

## 2024-04-07 RX ADMIN — ONDANSETRON 4 MG: 2 INJECTION INTRAMUSCULAR; INTRAVENOUS at 11:13

## 2024-04-07 RX ADMIN — SUMATRIPTAN SUCCINATE 50 MG: 50 TABLET ORAL at 15:38

## 2024-04-07 RX ADMIN — HYDROCODONE BITARTRATE AND ACETAMINOPHEN 1 TABLET: 10; 325 TABLET ORAL at 17:55

## 2024-04-07 RX ADMIN — DULOXETINE HYDROCHLORIDE 60 MG: 60 CAPSULE, DELAYED RELEASE ORAL at 07:50

## 2024-04-07 RX ADMIN — HEPARIN SODIUM 18 UNITS/KG/HR: 10000 INJECTION, SOLUTION INTRAVENOUS at 22:00

## 2024-04-07 RX ADMIN — POTASSIUM CHLORIDE 40 MEQ: 1500 TABLET, EXTENDED RELEASE ORAL at 01:27

## 2024-04-07 RX ADMIN — ONDANSETRON 4 MG: 2 INJECTION INTRAMUSCULAR; INTRAVENOUS at 17:48

## 2024-04-07 RX ADMIN — DIPHENHYDRAMINE HYDROCHLORIDE 12.5 MG: 50 INJECTION INTRAMUSCULAR; INTRAVENOUS at 07:50

## 2024-04-07 RX ADMIN — HYDROCODONE BITARTRATE AND ACETAMINOPHEN 1 TABLET: 10; 325 TABLET ORAL at 09:54

## 2024-04-07 RX ADMIN — TOPIRAMATE 100 MG: 25 CAPSULE, COATED PELLETS ORAL at 09:54

## 2024-04-07 RX ADMIN — PIPERACILLIN AND TAZOBACTAM 4.5 G: 4; .5 INJECTION, POWDER, FOR SOLUTION INTRAVENOUS at 17:48

## 2024-04-07 RX ADMIN — Medication 10 ML: at 21:53

## 2024-04-07 RX ADMIN — Medication 10 ML: at 07:55

## 2024-04-07 NOTE — CONSULTS
HEMATOLOGY/ONCOLOGY INPATIENT CONSULT    REASON FOR CONSULT: Right gonadal vein thrombus    Subjective   HISTORY OF PRESENT ILLNESS;   Ms Florence is a 43-year-old lady with past medical history of migraines who presented [facility] with abdominal pain and back pain.  Notes that she has been having some fatigue and nausea over the past several weeks.  Developed some right-sided abdominal pain as well as a rash in her right axilla and inguinal region.  She underwent a CT scan on presentation which noted right gonadal vein thrombus.  She does note some leg pain and discomfort but states that this is improved with steroids in the past.  Denies any swelling, redness today.  Does note a family history of blood clots in her father.  She denies any long car rides or plane rides.  Denies any recent COVID infection or vaccination.      Past Medical History:   Diagnosis Date    Migraine     Pneumonia      Past Surgical History:   Procedure Laterality Date    DISC REMOVAL      lumbar    HYSTERECTOMY         No current facility-administered medications on file prior to encounter.     Current Outpatient Medications on File Prior to Encounter   Medication Sig Dispense Refill    HYDROcodone-acetaminophen (NORCO)  MG per tablet Take 1 tablet by mouth Every 6 (Six) Hours As Needed for Moderate Pain.      lidocaine (LIDODERM) 5 % Apply 1 patch topically to the appropriate area as directed As Needed.      promethazine (PHENERGAN) 25 MG tablet As Needed.      tiZANidine (ZANAFLEX) 4 MG tablet Take 1 tablet by mouth At Night As Needed for Muscle Spasms.      topiramate (TOPAMAX) 100 MG tablet Take 1 tablet by mouth Daily.      albuterol sulfate  (90 Base) MCG/ACT inhaler As Needed.      DULoxetine (CYMBALTA) 60 MG capsule Take 1 capsule by mouth Daily.      gabapentin (NEURONTIN) 800 MG tablet Take 1 tablet by mouth Daily.      predniSONE (DELTASONE) 20 MG tablet As Needed.      PSYLLIUM PO Take 1 packet by  "mouth.      SUMAtriptan (IMITREX) 50 MG tablet Take 1 tablet by mouth As Needed.         Allergies   Allergen Reactions    Vancomycin Other (See Comments)     Vancomycin infusion reaction after completion of administration; tolerated w/ slowed infusion rate  Red Man syndrome - not an allergy        Social History     Socioeconomic History    Marital status:    Tobacco Use    Smoking status: Never    Smokeless tobacco: Never   Vaping Use    Vaping status: Never Used   Substance and Sexual Activity    Alcohol use: Never    Drug use: Never    Sexual activity: Defer       Family History   Problem Relation Age of Onset    Heart attack Mother     Heart disease Father     Lung disease Father     Diabetes Sister     Diabetes Brother          REVIEW OF SYSTEMS:  A 12 point review of systems was performed and is negative except as noted above.    Objective   PHYSICAL EXAM:    BP 92/65 (BP Location: Left arm, Patient Position: Sitting)   Pulse 67   Temp 97.6 °F (36.4 °C) (Oral)   Resp 16   Ht 175.3 cm (69.02\")   Wt 51.9 kg (114 lb 6 oz)   LMP  (LMP Unknown)   SpO2 100%   BMI 16.88 kg/m²     General: well appearing female in no acute distress  HEENT: sclerae anicteric, oropharynx clear  Lymphatics: no cervical, supraclavicular, inguinal, or axillary adenopathy  Neck: Supple. No thyromegaly.  Cardiovascular: regular rate and rhythm, no murmurs  Lungs: clear to auscultation bilaterally. No respiratory distress  Abdomen: soft, nontender, nondistended.  No palpable organomegaly  Extremities: no lower extremity edema, cyanosis, or clubbing  Skin: Rash noted in right axilla, lesions, bruising, or petechiae  Neuro: Alert and oriented x3. Moves all extremities.    Results:    Results from last 7 days   Lab Units 04/07/24  0444 04/06/24  1628 04/01/24  1534   WBC 10*3/mm3 4.64 6.43 5.03   HEMOGLOBIN g/dL 12.1 13.3 13.6   PLATELETS 10*3/mm3 147 187 151*     Results from last 7 days   Lab Units 04/07/24 0444 04/06/24  1628 "   SODIUM mmol/L 142 141   POTASSIUM mmol/L 5.0 3.4*   CO2 mmol/L 21.0* 25.0   BUN mg/dL 13 15   CREATININE mg/dL 0.74 0.80   GLUCOSE mg/dL 80 102*     Results from last 7 days   Lab Units 04/06/24  1628   AST (SGOT) U/L 17   ALT (SGPT) U/L 9   BILIRUBIN mg/dL 0.5   ALK PHOS U/L 49         US Gallbladder    Result Date: 4/6/2024  Impression: Normal exam. Electronically Signed: Ignacio Ponce MD  4/6/2024 7:24 PM EDT  Workstation ID: [id number]    XR Chest 1 View    Result Date: 4/6/2024  Impression: No acute process. Electronically Signed: Ignacio Ponce MD  4/6/2024 6:42 PM EDT  Workstation ID: [id number]    CT Abdomen Pelvis With Contrast    Result Date: 4/6/2024  Thrombosis within a dilated right gonadal vein. No surrounding adenopathy. Normal appearance of the gallbladder. No rib fractures. Electronically Signed: Palmer Boo MD  4/6/2024 6:12 PM EDT  Workstation ID: [id number]     Assessment    ASSESSMENT & PLAN:  Right gonadal vein thrombosis  -Unclear etiology at this time with appears to be unprovoked  -Bilateral lower extremity duplex pending  -Currently on heparin drip  -Okay to transition to Eliquis 10 mg twice daily x 7 days followed by 5 mg twice daily for at least 3 months  -Hypercoagulable workup panel pending  -Will plan for outpatient follow-up with me in about 2 weeks post discharge to go over her hypercoagulable workup    Rash  -Unclear etiology although given the location, probably fungal in nature  -Management per primary team    Devyn Harvey MD  Hematology and Oncology    4/7/2024  10:13 EDT

## 2024-04-07 NOTE — PLAN OF CARE
Goal Outcome Evaluation:              Outcome Evaluation: Swallowing assessment completed at bedside.  Chronic dysphagia c/o which sound esophageal. MBS with limited recommended and ordered. Continue current diet pending MBS.                 SLP Swallowing Diagnosis: functional oral phase, functional pharyngeal phase, suspected esophageal dysphagia (04/07/24 1500)

## 2024-04-07 NOTE — PROGRESS NOTES
HEPARIN INFUSION  Radha Florence is a  43 y.o. female receiving heparin infusion.     Therapy for (VTE/Cardiac):   VTE  Patient Weight: 51.3 kg  Initial Bolus (Y/N):   Y  Any Bolus (Y/N):   Y        Signs or Symptoms of Bleeding: N/A    VTE (PE/DVT)   Initial Bolus: 80 units/kg (Max 10,000 units)  Initial rate: 18 units/kg/hr (Max 1,500 units/hr)    Anti Xa Rebolus Infusion Hold time Change infusion Dose (Units/kg/hr) Next Anti Xa Level Due   < 0.11 50 Units/kg  (4000 Units Max) None Increase by  4 Units/kg/hr 6 hours   0.11 - 0.19 25 Units/kg  (2000 Units Max) None Increase by  3 Units/kg/hr 6 hours   0.2 - 0.29 0 None Increase by  2 Units/kg/hr 6 hours   0.3 - 0.7 0 None No Change 6 hours (after 2 consecutive levels in range check qAM)   0.71 - 0.8 0 None Decrease by  1 Units/kg/hr 6 hours   0.81 - 0.9 0 None Decrease by  2 Units/kg/hr 6 hours   0.91 - 1 0 60 Minutes Decrease by  3 Units/kg/hr 6 hours   >1 0 Hold  After Anti Xa less than 0.7 decrease previous rate by  4 Units/kg/hr  Every 2 hours until Anti Xa is less than 0.7 then when infusion restarts in 6 hours     Results from last 7 days   Lab Units 04/07/24  0444 04/06/24  1628 04/01/24  1534   INR   --  1.07  --    HEMOGLOBIN g/dL 12.1 13.3 13.6   HEMATOCRIT % 38.3 41.9 42.7   PLATELETS 10*3/mm3 147 187 151*          Date   Time   Anti-Xa Current Rate (Unit/kg/hr) Bolus   (Units) Rate Change   (Unit/kg/hr) New Rate (Unit/kg/hr) Next   Anti-Xa Comments  Pump Check Daily   4/6 1950 0.1 New 4100 +18 18 0400    4/7 0444 0.51 18 -- -- 18 1000 Discussed w/ nurse   4/7 1017 0.53 18 -- -- 18 1800 DW RN

## 2024-04-07 NOTE — CONSULTS
Malnutrition Severity Assessment    Patient Name:  Radha Florence  YOB: 1980  MRN: [id number]  Admit Date:  4/6/2024    Patient meets criteria for : Moderate (non-severe) Malnutrition    Comments:  Pt meets criteria for chronic, moderate malnutrition with the indicators of mild subcutaneous fat loss and moderate muscle wasting. Of note, pt reports initial unintentional wt loss beginning in 2019 and has since been stable x2yrs. Pt with non-significant weight loss of 2.6% x1 month & 5% x3 months of concern as denied changes to PO intake.     Malnutrition Severity Assessment  Malnutrition Type: Chronic Disease - Related Malnutrition  Malnutrition Type (Last 8 Hours)       Malnutrition Severity Assessment       Row Name 04/07/24 0921       Malnutrition Severity Assessment    Malnutrition Type Chronic Disease - Related Malnutrition      Row Name 04/07/24 0921       Muscle Loss    Loss of Muscle Mass Findings Moderate    Buddhism Region Moderate - slight depression    Clavicle Bone Region Moderate - some protrusion in females, visible in males    Acromion Bone Region Moderate - acromion may slightly protrude    Patellar Region Moderate - patella more prominent, less muscle definition around patella      Row Name 04/07/24 0921       Fat Loss    Subcutaneous Fat Loss Findings Mild    Orbital Region  --  mild    Upper Arm Region --  mild      Row Name 04/07/24 0921       Criteria Met (Must meet criteria for severity in at least 2 of these categories: M Wasting, Fat Loss, Fluid, Secondary Signs, Wt. Status, Intake)    Patient meets criteria for  Moderate (non-severe) Malnutrition                    Electronically signed by:  Estela Vázquez, MS,RD,LD  04/07/24 09:40 EDT

## 2024-04-07 NOTE — THERAPY EVALUATION
Acute Care - Speech Language Pathology   Swallow Initial Evaluation [facility]  Clinical Swallow Evaluation       Patient Name: Radha Florence  : 1980  MRN: [id number]  Today's Date: 2024               Admit Date: 2024    Visit Dx:     ICD-10-CM ICD-9-CM   1. Thrombus  I82.90 453.9   2. Dysphagia, unspecified type  R13.10 787.20     Patient Active Problem List   Diagnosis    Gonadal Vein Thrombosis     Past Medical History:   Diagnosis Date    Migraine     Pneumonia      Past Surgical History:   Procedure Laterality Date    DISC REMOVAL      lumbar    HYSTERECTOMY         SLP Recommendation and Plan  SLP Swallowing Diagnosis: functional oral phase, functional pharyngeal phase, suspected esophageal dysphagia (24 1500)  SLP Diet Recommendation: regular textures, thin liquids (24 1500)  Recommended Precautions and Strategies: upright posture during/after eating, reflux precautions (24 1500)  SLP Rec. for Method of Medication Administration: meds whole, meds crushed, with puree (24 1500)     Monitor for Signs of Aspiration: notify SLP if any concerns (24 1500)  Recommended Diagnostics: VFSS (MBS), with esophageal screen (24 1500)  Swallow Criteria for Skilled Therapeutic Interventions Met: demonstrates skilled criteria (24 1500)     Rehab Potential/Prognosis, Swallowing: good, to achieve stated therapy goals (24 1500)  Therapy Frequency (Swallow): PRN (24 1500)  Predicted Duration Therapy Intervention (Days): 1 day, 2 days (24 1500)  Oral Care Recommendations: Oral Care BID/PRN, Toothbrush (24 1500)                                        Outcome Evaluation: Swallowing assessment completed at bedside.  Chronic dysphagia c/o which sound esophageal. MBS with limited recommended and ordered. Continue current diet pending MBS.      SWALLOW EVALUATION (Last 72 Hours)       SLP Adult Swallow Evaluation       Row Name 24 0422                    Rehab Evaluation    Document Type evaluation  -ML        Subjective Information complains of;pain  chronic dysphagia  -ML        Patient Observations alert;cooperative  -ML        Patient/Family/Caregiver Comments/Observations chronic dysphagia, hoarseness at night- on protonix QD am.  -ML        Patient Effort adequate  -ML        Oral Care other (see comments)  Independent with oral care  -ML           General Information    Patient Profile Reviewed yes  -ML        Pertinent History Of Current Problem pt admitted with right gonadal vein thrombosis, abdominal and back pain- chronic dysphagia with certain foods, hoarseness, and reflux.  Scheduled for EGD August, 2024  -ML        Current Method of Nutrition regular textures;thin liquids  -ML        Precautions/Limitations, Vision WFL;for purposes of eval  -ML        Precautions/Limitations, Hearing WFL;for purposes of eval  -ML        Prior Level of Function-Communication WFL  -ML        Prior Level of Function-Swallowing regular textures;thin liquids  Avoidance of certain foods  -ML        Plans/Goals Discussed with patient;agreed upon  -ML        Barriers to Rehab none identified  -ML           Pain    Additional Documentation Pain Scale: Numbers Pre/Post-Treatment (Group)  -ML           Pain Scale: Numbers Pre/Post-Treatment    Pretreatment Pain Rating 7/10  -ML        Posttreatment Pain Rating 7/10  -ML        Pain Location - head  -ML        Pre/Posttreatment Pain Comment RN aware and in room with medication  -ML        Pain Intervention(s) --  RN in room with medication during evaluation.  -ML           Oral Motor Structure and Function    Dentition Assessment natural, present and adequate  -ML        Secretion Management WNL/WFL  -ML        Mucosal Quality dry  -ML        Volitional Swallow delayed  -ML        Volitional Cough WFL  -ML           Oral Musculature and Cranial Nerve Assessment    Oral Motor General Assessment WFL  -ML           General  Eating/Swallowing Observations    Respiratory Support Currently in Use room air  -ML        Eating/Swallowing Skills self-fed  -ML        Positioning During Eating upright in bed  -ML        Utensils Used spoon;cup;straw  -ML        Consistencies Trialed regular textures;thin liquids  -ML           Clinical Swallow Eval    Oral Prep Phase WFL  -ML        Oral Transit WFL  -ML        Oral Residue WFL  -ML        Pharyngeal Phase no overt signs/symptoms of pharyngeal impairment  -ML        Esophageal Phase suspected esophageal impairment  -ML        Clinical Swallow Evaluation Summary Clinical swallowing assessment completed.  Oral and pharyngeal phase appear functional. Slight delay/hesitancy in swallow trigger- pt has to be intentional with swallowing.  Pt with esophageal c/o- hoarseness/foods stick/avoidance of foods found to be problematic.  Discussed MBS with limited- pt is in agreement.  -ML           Esophageal Phase Concerns    Esophageal Phase Concerns globus;sensation of material sticking  -ML        Globus mechanical soft;regular consistencies  -ML        Sensation of Material Sticking mechanical soft;regular consistencies  -ML           SLP Evaluation Clinical Impression    SLP Swallowing Diagnosis functional oral phase;functional pharyngeal phase;suspected esophageal dysphagia  -ML        Functional Impact risk of malnutrition  -ML        Rehab Potential/Prognosis, Swallowing good, to achieve stated therapy goals  -ML        Swallow Criteria for Skilled Therapeutic Interventions Met demonstrates skilled criteria  -ML           Recommendations    Therapy Frequency (Swallow) PRN  -ML        Predicted Duration Therapy Intervention (Days) 1 day;2 days  -ML        SLP Diet Recommendation regular textures;thin liquids  -ML        Recommended Diagnostics VFSS (MBS);with esophageal screen  -ML        Recommended Precautions and Strategies upright posture during/after eating;reflux precautions  -ML        Oral  Care Recommendations Oral Care BID/PRN;Toothbrush  -ML        SLP Rec. for Method of Medication Administration meds whole;meds crushed;with puree  -ML        Monitor for Signs of Aspiration notify SLP if any concerns  -ML                  User Key  (r) = Recorded By, (t) = Taken By, (c) = Cosigned By      Initials Name Effective Dates    Kathryn Inman CCC-SLP 06/16/21 -                     EDUCATION  The patient has been educated in the following areas:   Dysphagia (Swallowing Impairment) MBS with limited upper discussed/reflux symptoms education .        SLP GOALS       Row Name 04/07/24 1500             (LTG) Patient will demonstrate functional swallow for    Diet Texture (Demonstrate functional swallow) regular textures  -ML      Liquid viscosity (Demonstrate functional swallow) thin liquids  -ML      [place] (Demonstrate functional swallow) independently (over 90% accuracy)  -ML      Time Frame (Demonstrate functional swallow) 1 week  -ML      Progress/Outcomes (Demonstrate functional swallow) new goal  -ML         (STG) Swallow Compensatory Strategies Goal 1 (SLP)    Activity (Swallow Compensatory Strategies/Techniques Goal 1, SLP) reflux precautions  -ML      [place]/Accuracy (Swallow Compensatory Strategies/Techniques Goal 1, SLP) independently (over 90% accuracy)  -ML      Time Frame (Swallow Compensatory Strategies/Techniques Goal 1, SLP) short term goal (STG);1 day;2 days  -ML      Progress/Outcomes (Swallow Compensatory Strategies/Techniques Goal 1, SLP) new goal  -ML                User Key  (r) = Recorded By, (t) = Taken By, (c) = Cosigned By      Initials Name Provider Type    Kathryn Inman CCC-SLP Speech and Language Pathologist                       Time Calculation:    Time Calculation- SLP       Row Name 04/07/24 1610             Time Calculation- SLP    SLP Start Time 1500  -ML      SLP Received On 04/07/24  -ML                User Key  (r) = Recorded By, (t) =  Taken By, (c) = Cosigned By      Initials Name Provider Type     Kathryn Redding, CCC-SLP Speech and Language Pathologist                    Therapy Charges for Today       Code Description Service Date Service Provider Modifiers Qty    [id number]  ST EVAL ORAL PHARYNG SWALLOW 3 4/7/2024 Kathryn Redding, YASEMIN-SLP GN 1                 RYAN Fink  4/7/2024

## 2024-04-07 NOTE — PROGRESS NOTES
[facility] Medicine Services  PROGRESS NOTE    Patient Name: Radha Florence  : 1980  MRN: [id number]    Date of Admission: 2024  Primary Care Physician: Chantel Baker MD    Subjective   Subjective     CC: R abdominal pain    HPI: Pain about the same. No f/c. No n/v. No dyspnea.       Objective   Objective     Vital Signs:   Temp:  [97.6 °F (36.4 °C)-97.8 °F (36.6 °C)] 97.6 °F (36.4 °C)  Heart Rate:  [55-72] 60  Resp:  [14-16] 16  BP: ()/(51-85) 105/67     Physical Exam:  NAD, alert and oriented  OP clear, dry MM  Neck supple  No LAD  RRR  CTAB  +BS, soft    Results Reviewed:  LAB RESULTS:      Lab 24  1534   WBC 4.64  --  6.43 5.03   HEMOGLOBIN 12.1  --  13.3 13.6   HEMATOCRIT 38.3  --  41.9 42.7   PLATELETS 147  --  187 151*   NEUTROS ABS 2.32  --  3.82 2.97   IMMATURE GRANS (ABS) 0.01  --  0.02 0.01   LYMPHS ABS 1.72  --  2.02 1.57   MONOS ABS 0.41  --  0.45 0.37   EOS ABS 0.15  --  0.09 0.09   MCV 96.2  --  94.2 96   SED RATE  --   --  2  --    CRP  --   --  <0.30  --    PROTIME  --   --  14.0  --    APTT  --   --  28.2*  --    HEPARIN ANTI-XA 0.51 0.10*  --   --    D DIMER QUANT  --   --  0.42  --          Lab 24  1534   SODIUM 142 141  --    POTASSIUM 5.0 3.4*  --    CHLORIDE 112* 107  --    CO2 21.0* 25.0  --    ANION GAP 9.0 9.0  --    BUN 13 15  --    CREATININE 0.74 0.80  --    EGFR 103.1 93.9  --    GLUCOSE 80 102*  --    CALCIUM 8.3* 9.1  --    MAGNESIUM  --  2.0 2.0         Lab 24  1628   TOTAL PROTEIN 7.4   ALBUMIN 4.7   GLOBULIN 2.7   ALT (SGPT) 9   AST (SGOT) 17   BILIRUBIN 0.5   ALK PHOS 49         Lab 24  1628   HSTROP T <6   PROTIME 14.0   INR 1.07             Lab 24  1534   IRON 104   IRON SATURATION 30   TIBC 348   TRANSFERRIN 278   FERRITIN 50   VITAMIN B 12 >2,000*         Brief Urine Lab Results  (Last result in the past 365 days)        Color    Clarity   Blood   Leuk Est   Nitrite   Protein   CREAT   Urine HCG        04/06/24 1627 Yellow   Cloudy   Negative   Negative   Negative   Negative                   Microbiology Results Abnormal       Procedure Component Value - Date/Time    COVID PRE-OP / PRE-PROCEDURE SCREENING ORDER (NO ISOLATION) - Swab, Nasopharynx [[id number]]  (Normal) Collected: 04/07/24 0001    Lab Status: Final result Specimen: Swab from Nasopharynx Updated: 04/07/24 0024    Narrative:      The following orders were created for panel order COVID PRE-OP / PRE-PROCEDURE SCREENING ORDER (NO ISOLATION) - Swab, Nasopharynx.  Procedure                               Abnormality         Status                     ---------                               -----------         ------                     COVID-19 and FLU A/B PCR...[[id number]]  Normal              Final result                 Please view results for these tests on the individual orders.    COVID-19 and FLU A/B PCR, 1 HR TAT - Swab, Nasopharynx [[id number]]  (Normal) Collected: 04/07/24 0001    Lab Status: Final result Specimen: Swab from Nasopharynx Updated: 04/07/24 0024     COVID19 Not Detected     Influenza A PCR Not Detected     Influenza B PCR Not Detected    Narrative:      Fact sheet for providers: https://www.fda.gov/media/963631/download    Fact sheet for patients: https://www.fda.gov/media/193805/download    Test performed by PCR.            US Gallbladder    Result Date: 4/6/2024  US GALLBLADDER Date of Exam: 4/6/2024 6:57 PM EDT Indication: ruq abd pain. Comparison: CT abdomen and pelvis with contrast 4/6/2024 Technique: Grayscale and color Doppler ultrasound evaluation of the right upper quadrant was performed. Findings: Visualized portions of the pancreatic parenchyma demonstrate normal echogenicity, bowel gas shadowing limits complete pancreatic parenchymal assessment. The background liver echogenicity is within normal limits for technique. No perihepatic ascites. There is  hepatopetal flow in the portal vein. The visualized hepatic veins are patent. The gallbladder is present. Negative for cholelithiasis. No pericholecystic fluid. Normal caliber common bile duct measuring 5 mm. The right kidney measures 9.8 x 4.6 x 4.2 cm. No right-sided hydronephrosis.     Impression: Impression: Normal exam. Electronically Signed: Ignacio Ponce MD  4/6/2024 7:24 PM EDT  Workstation ID: [id number]    XR Chest 1 View    Result Date: 4/6/2024  XR CHEST 1 VW Date of Exam: 4/6/2024 6:18 PM EDT Indication: Weak/Dizzy/AMS triage protocol Comparison: 6/18/2023 Findings: The cardiomediastinal silhouette is within normal limits. Lungs are clear. No focal consolidation, pneumothorax, or significant pleural effusion. Osseous structures grossly intact.     Impression: Impression: No acute process. Electronically Signed: Ignacio Ponce MD  4/6/2024 6:42 PM EDT  Workstation ID: [id number]    CT Abdomen Pelvis With Contrast    Result Date: 4/6/2024  CT ABDOMEN PELVIS W CONTRAST Date of Exam: 4/6/2024 5:32 PM EDT Indication: ruq abd pain. Comparison: None available. Technique: Axial CT images were obtained of the abdomen and pelvis following the uneventful intravenous administration of 90 cc Isovue-300 . Reconstructed coronal and sagittal images were also obtained. Automated exposure control and iterative construction methods were used. FINDINGS: Lung bases: No masses. No consolidation. Liver:No masses. No intrahepatic biliary ductal dilatation. Spleen:No masses. No perisplenic hematoma. Pancreas:No pancreatic masses. No evidence of pancreatitis. Gallbladder and common bile duct:No evidence of cholelithiasis. No evidence of cholecystitis. Adrenal glands:No adrenal masses Kidneys and ureters:No kidney stones. No renal masses.No calculi present within the ureters. Normal caliber ureters. Urinary bladder:No urinary bladder wall thickening. No bladder masses. Small bowel:Normal caliber small bowel. Large bowel: Extensive  colonic diverticulosis without evidence of diverticulitis. Appendix: Not seen however there is no evidence of appendicitis GENITOURINARY: Right corpus luteum cyst. Ascites or pneumoperitoneum:None. Adenopathy:None present Osseous structures:Normal Other findings: Thrombosis within a focally dilated right gonadal vein. The gonadal vein measures up to 1.4 cm.     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 No surrounding adenopathy. Normal appearance of the gallbladder. No rib fractures. Electronically Signed: Palmer Boo MD  4/6/2024 6:12 PM EDT  Workstation ID: [id number]         Current medications:  Scheduled Meds:DULoxetine, 60 mg, Oral, Daily  gabapentin, 800 mg, Oral, Daily  hydrocortisone, 1 Application, Topical, Q12H  sodium chloride, 10 mL, Intravenous, Q12H  topiramate, 100 mg, Oral, Daily      Continuous Infusions:heparin, 18 Units/kg/hr, Last Rate: 18 Units/kg/hr (04/06/24 2204)  Pharmacy to Dose Heparin,       PRN Meds:.  acetaminophen    senna-docusate sodium **AND** polyethylene glycol **AND** bisacodyl **AND** bisacodyl    Calcium Replacement - Follow Nurse / BPA Driven Protocol    HYDROcodone-acetaminophen    Magnesium Standard Dose Replacement - Follow Nurse / BPA Driven Protocol    ondansetron    Pharmacy to Dose Heparin    Phosphorus Replacement - Follow Nurse / BPA Driven Protocol    Potassium Replacement - Follow Nurse / BPA Driven Protocol    sodium chloride    sodium chloride    sodium chloride    SUMAtriptan    Assessment & Plan   Assessment & Plan     Active Hospital Problems    Diagnosis  POA    **Gonadal Vein Thrombosis [I82.409]  Yes      Resolved Hospital Problems   No resolved problems to display.        Brief Hospital Course to date:  Radha Florence is a 43 y.o. female with PMHx significant for migraines, AUB and LGSIL pap s/p YOLA 12/2022 at  who presents to [facility] with vague abdominal pain and RUQ back pain found to have incidental gonadal vein thrombus.     R Gonadal  Vein Thrombosis:  AUB, LGSIL on Pap s/p YOLA 12/22/2022 at   -rare, incidental finding, with unclear etiology  -CT A/P, US reviwed  -duplex pending  -on heparin, can likely transition to Eliquis  -coagulopathy serology ordered  -GYN/hematology consutled    Hx of migraines  -resumed home meds  Expected Discharge Location and Transportation: Home  Expected Discharge 4/7  Expected discharge date/ time has not been documented.     DVT prophylaxis:  Medical DVT prophylaxis orders are present.         AM-PAC 6 Clicks Score (PT): 24 (04/06/24 2026)    CODE STATUS:   Code Status and Medical Interventions:   Ordered at: 04/06/24 1951     Level Of Support Discussed With:    Patient     Code Status (Patient has no pulse and is not breathing):    CPR (Attempt to Resuscitate)     Medical Interventions (Patient has pulse or is breathing):    Full Support       Dion Comer MD  04/07/24

## 2024-04-07 NOTE — CONSULTS
Clinical Nutrition   Nutrition Assessment  Reason for Visit: MST score 2+, BMI, Consult, Unintentional weight loss, Reduced oral intake    Patient Name: Radha Florence  YOB: 1980  MRN: [id number]  Date of Encounter: 04/07/24 09:18 EDT  Admission date: 4/6/2024    Comments:    Pt meets criteria for chronic, moderate malnutrition with the indicators of mild subcutaneous fat loss and moderate muscle wasting. Of note, pt reports initial unintentional wt loss beginning in 2019 and has since been stable x2yrs. Pt with non-significant weight loss of 2.6% x1 month & 5% x3 months of concern as denied changes to PO intake.     Encouraged PO intake as tolerated  Continue to provide  PRN antiemetics  Declined ONS at this time  Close monitoring of meal intake  SLP consult requested  Reported difficulty swallowing meds and foods (? Esophageal based on description)  Continue home bowel regimen  Chronic constipation on home bowel regimen with BM 1x weekly    Nutrition Assessment   Admission Diagnosis:  DVT (deep venous thrombosis) [I82.409]    Problem List:    Gonadal Vein Thrombosis  Moderate Malnutrition    PMH:   She  has a past medical history of Migraine and Pneumonia.    PSH:  She  has a past surgical history that includes Hysterectomy and Disc removal.    Applicable Nutrition Concerns:   Skin:  Oral:  GI:    Applicable Interval History:   N/A      Reported/Observed/Food/Nutrition Related History:     Pt laying in bed with spouse present at time of visit, able to provide weight/nutrition hx. Endorsed good appetite however reports nausea with PO intake - resolved with antiemetics. Pt with unintentional weight loss despite no changes to PO intake. Reports initial weight loss starting in 2019 and stabilizing in 2022 - denies weight loss following YOLA (2022). Discussed ONS, pt has general distaste for supplements. Chronic constipation - takes home bowel regimen (Amitiza, docusate, etc) however  "consistently 1 BM per wk. Endorsed dysphagia - has been unable to be evaluated outpatient due to scheduling challenges, agreeable to SLP consult. [facility]     Anthropometrics     Height: Height: 175.3 cm (69.02\")  Last Filed Weight: Weight: 51.9 kg (114 lb 6 oz) (04/06/24 2030)  Method: Weight Method: Standing scale  BMI: BMI (Calculated): 16.9    UBW:     Weight      Weight (kg) Weight (lbs) Weight Method   6/18/2023 56.7 kg  125 lb  Stated    12/13/2023 51.71 kg  114 lb     4/6/2024 51.88 kg  114 lb 6 oz  Standing scale     51.256 kg  113 lb       3/5/24: 117#   1/8/24: 120#  10/31/23: 125#    Weight change: weight loss of 11 lbs (8.8%) over the past 6 month(s)    Intentional?  No    Nutrition Focused Physical Exam     Date:  4/7       Patient meets criteria for malnutrition diagnosis, see MSA note.     Current Nutrition Prescription   PO: Diet: Regular/House; Fluid Consistency: Thin (IDDSI 0)  Oral Nutrition Supplement: N/A  Intake:  ~50% of breakfast observed    Nutrition Diagnosis   Date:  4/7            Updated:    Problem Malnutrition  chronic, moderate   Etiology Energy in < energy out   Signs/Symptoms mild subcutaneous fat loss and moderate muscle wasting   Status: New    Date:  4/7    Updated:     Problem Predicted suboptimal energy intake   Etiology ? Dysphagia and nausea   Signs/Symptoms ~50% at breakfast, SLP consult requested   Status: New    Goal:   Nutrition to support treatment and Tolerate PO, Increase intake      Nutrition Intervention      Follow treatment progress, Care plan reviewed, Advise alternate selection, Encourage intake, Supplement offered/refused      Monitoring/Evaluation:   Per protocol, I&O, PO intake, Weight, Symptoms, POC/GOC, Swallow function      Estela Vázquez, MS,RD,LD  Time Spent: 25min  "

## 2024-04-07 NOTE — CONSULTS
[facility]  GYN History and Physical    Chief Complaint   Patient presents with    Weakness - Generalized       Subjective     Patient is a 43 y.o. 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  Patient presented to ED for abdominal pain, nausea, and RUQ pain.  She reports 2 weeks of fatigue and nausea.  She reports one week of intermittent chills, has not checked  a temperature.  She c/o years of bilateral lower leg pain and cramping.  She developed a pruritic rash in armpits and groin over the last week. She reports years of pelvic pain.     She is s/p TRH at  2022 for recurrent abnormal pap smears.  She is s/p LSC tubal ligation in .  She has had 5 SVDs. Weights ranged from 6-8#.        The following portions of the patients history were reviewed and updated as appropriate: current medications, allergies, past medical history, past surgical history, past family history, past social history, and problem list .               Past OB History:       OB History    Para Term  AB Living   6 5 4 1 1 5   SAB IAB Ectopic Molar Multiple Live Births   1 0 0 0 0 5      # Outcome Date GA Lbr Pedrito/2nd Weight Sex Type Anes PTL Lv   6 Term     M Vag-Spont   JIMI   5 Term 2009    M Vag-Spont   JIMI   4 Term 2006    F Vag-Spont   JIMI   3      F Vag-Spont   JIMI   2 Term     F Vag-Spont   JIMI   1 SAB                Past Medical History: Past Medical History:   Diagnosis Date    Migraine     Pneumonia       Past Surgical History Past Surgical History:   Procedure Laterality Date    DISC REMOVAL      lumbar    HYSTERECTOMY        Family History: Family History   Problem Relation Age of Onset    Heart attack Mother     Heart disease Father     Lung disease Father     Diabetes Sister     Diabetes Brother       Social History:  reports that she has never smoked. She has never used smokeless tobacco.   reports no history of alcohol use.   reports no history of  drug use.        General ROS: Pertinent items are noted in HPI, all other systems reviewed and negative    Objective       Vital Signs Range for the last 24 hours  Temperature: Temp:  [97.6 °F (36.4 °C)-97.8 °F (36.6 °C)] 97.6 °F (36.4 °C)   Temp Source: Temp src: Oral   BP: BP: ()/(51-85) 92/65   Pulse: Heart Rate:  [55-72] 67   Respirations: Resp:  [14-16] 16   SPO2: SpO2:  [98 %-100 %] 100 %   O2 Amount (l/min):     O2 Devices Device (Oxygen Therapy): room air   Weight: Weight:  [51.3 kg (113 lb)-51.9 kg (114 lb 6 oz)] 51.9 kg (114 lb 6 oz)               Physical Examination: General appearance - alert, well appearing, and in no distress  Abdomen - soft, nontender, nondistended, no masses or organomegaly  Musculoskeletal - no joint tenderness, deformity or swelling  Extremities - peripheral pulses normal, no pedal edema, no clubbing or cyanosis  Skin - normal coloration and turgor, no rashes, no suspicious skin lesions noted        Laboratory Results:   Results from last 7 days   Lab Units 04/07/24  0444   WBC 10*3/mm3 4.64   HEMOGLOBIN g/dL 12.1   HEMATOCRIT % 38.3   PLATELETS 10*3/mm3 147         Component      Latest Ref Rng 4/7/2024   Glucose      65 - 99 mg/dL 80    BUN      6 - 20 mg/dL 13    Creatinine      0.57 - 1.00 mg/dL 0.74    Sodium      136 - 145 mmol/L 142    Potassium      3.5 - 5.2 mmol/L 5.0    Chloride      98 - 107 mmol/L 112 (H)    CO2      22.0 - 29.0 mmol/L 21.0 (L)    Calcium      8.6 - 10.5 mg/dL 8.3 (L)    BUN/Creatinine Ratio      7.0 - 25.0  17.6    Anion Gap      5.0 - 15.0 mmol/L 9.0    eGFR      >60.0 mL/min/1.73 103.1              CT A/P  FINDINGS:     Lung bases: No masses. No consolidation.     Liver:No masses. No intrahepatic biliary ductal dilatation.     Spleen:No masses. No perisplenic hematoma.     Pancreas:No pancreatic masses. No evidence of pancreatitis.     Gallbladder and common bile duct:No evidence of cholelithiasis. No evidence of cholecystitis.     Adrenal  "glands:No adrenal masses     Kidneys and ureters:No kidney stones. No renal masses.No calculi present within the ureters. Normal caliber ureters.     Urinary bladder:No urinary bladder wall thickening. No bladder masses.     Small bowel:Normal caliber small bowel.     Large bowel: Extensive colonic diverticulosis without evidence of diverticulitis.     Appendix: Not seen however there is no evidence of appendicitis     GENITOURINARY: Right corpus luteum cyst.     Ascites or pneumoperitoneum:None.     Adenopathy:None present     Osseous structures:Normal     Other findings: Thrombosis within a focally dilated right gonadal vein. The gonadal vein measures up to 1.4 cm.     IMPRESSION:  Thrombosis within a dilated right gonadal vein. No surrounding adenopathy. Normal appearance of the gallbladder. No rib fractures.      Assessment & Plan       Gonadal Vein Thrombosis        Assessment:  1.  44yo with right ovarian vein thrombosis of uncertain etiology.  Vague pelvic pain for \"years\" and nausea and chills for 1-2 weeks.      Plan:  1. Agree with anticoagulation, thrombophilia workup, duplex US to evaluate for LE VTE  2. Does not clinically meet picture for septic pelvic thrombophlebitis (afebrile, no leukocytosis), but out of an abundance of caution, recommend zosyn while inpatient.  Orders placed.  No indication for outpatient antibiotics.  3. Recommend outpatient follow up with her GYN at  as well as consultation with [facility] in case of a vascular condition such as pelvic congestion syndrome or May Thurner Syndrome.    4. Plan of care has been reviewed with patient and her   5.  Risks, benefits of treatment plan have been discussed.  6.  All questions have been answered.  7. Remainder of cares per primary team    Thank you for this consultation.  Gynecology will sign off at this time, but please call with any further questions.          Jeannine Finley MD  4/7/2024  10:22 EDT   "

## 2024-04-07 NOTE — PLAN OF CARE
Problem: Adult Inpatient Plan of Care  Goal: Plan of Care Review  Outcome: Ongoing, Progressing  Goal: Patient-Specific Goal (Individualized)  Outcome: Ongoing, Progressing  Goal: Absence of Hospital-Acquired Illness or Injury  Outcome: Ongoing, Progressing  Intervention: Identify and Manage Fall Risk  Recent Flowsheet Documentation  Taken 4/7/2024 0400 by Jeannine Zambrano RN  Safety Promotion/Fall Prevention:   assistive device/personal items within reach   clutter free environment maintained   fall prevention program maintained   nonskid shoes/slippers when out of bed   room organization consistent   safety round/check completed  Taken 4/7/2024 0000 by Jeannine Zambrano RN  Safety Promotion/Fall Prevention:   assistive device/personal items within reach   clutter free environment maintained   fall prevention program maintained   nonskid shoes/slippers when out of bed   room organization consistent   safety round/check completed  Taken 4/6/2024 2200 by Jeannine Zambrano RN  Safety Promotion/Fall Prevention:   assistive device/personal items within reach   clutter free environment maintained   fall prevention program maintained   nonskid shoes/slippers when out of bed   room organization consistent   safety round/check completed  Taken 4/6/2024 2000 by Jeannine Zambrano RN  Safety Promotion/Fall Prevention:   assistive device/personal items within reach   clutter free environment maintained   fall prevention program maintained   nonskid shoes/slippers when out of bed   room organization consistent   safety round/check completed  Intervention: Prevent Skin Injury  Recent Flowsheet Documentation  Taken 4/7/2024 0400 by Jeannine Zambrano RN  Body Position:   position changed independently   weight shifting  Skin Protection:   tubing/devices free from skin contact   transparent dressing maintained   skin-to-device areas padded  Taken 4/7/2024 0000 by Jeannine Zambrano RN  Body Position:   position changed  independently   weight shifting  Skin Protection:   tubing/devices free from skin contact   transparent dressing maintained   skin-to-device areas padded  Taken 4/6/2024 2200 by Jeannine Zambrano RN  Body Position:   position changed independently   weight shifting  Skin Protection:   tubing/devices free from skin contact   transparent dressing maintained   skin-to-device areas padded  Taken 4/6/2024 2000 by Jeannine Zambrano RN  Body Position:   position changed independently   weight shifting  Skin Protection:   tubing/devices free from skin contact   transparent dressing maintained   skin-to-device areas padded  Intervention: Prevent and Manage VTE (Venous Thromboembolism) Risk  Recent Flowsheet Documentation  Taken 4/7/2024 0400 by Jeannine Zambrano RN  Activity Management: activity encouraged  Taken 4/7/2024 0000 by Jeannine Zambrano RN  Activity Management: activity encouraged  Taken 4/6/2024 2200 by Jeannine Zambrano RN  Activity Management: activity encouraged  Taken 4/6/2024 2000 by Jeannine Zambrano RN  Activity Management: activity encouraged  Intervention: Prevent Infection  Recent Flowsheet Documentation  Taken 4/7/2024 0400 by Jeannine Zambrano RN  Infection Prevention:   environmental surveillance performed   hand hygiene promoted   personal protective equipment utilized   single patient room provided  Taken 4/7/2024 0000 by Jeannine Zambrano RN  Infection Prevention:   environmental surveillance performed   hand hygiene promoted   personal protective equipment utilized   single patient room provided  Taken 4/6/2024 2200 by Jeannine Zambrano RN  Infection Prevention:   environmental surveillance performed   hand hygiene promoted   personal protective equipment utilized   single patient room provided  Taken 4/6/2024 2000 by Jeannine Zambrano RN  Infection Prevention:   environmental surveillance performed   hand hygiene promoted   personal protective equipment utilized   single patient room  provided  Goal: Optimal Comfort and Wellbeing  Outcome: Ongoing, Progressing  Intervention: Monitor Pain and Promote Comfort  Recent Flowsheet Documentation  Taken 4/6/2024 2200 by Jeannine Zambrano RN  Pain Management Interventions:   pain management plan reviewed with patient/caregiver   see MAR  Taken 4/6/2024 2000 by Jeannine Zambrano RN  Pain Management Interventions: pain management plan reviewed with patient/caregiver  Intervention: Provide Person-Centered Care  Recent Flowsheet Documentation  Taken 4/6/2024 2000 by Jeannine Zambrano RN  Trust Relationship/Rapport:   care explained   choices provided   emotional support provided   empathic listening provided   questions answered   questions encouraged   reassurance provided   thoughts/feelings acknowledged  Goal: Readiness for Transition of Care  Outcome: Ongoing, Progressing     Problem: Fall Injury Risk  Goal: Absence of Fall and Fall-Related Injury  Outcome: Ongoing, Progressing  Intervention: Identify and Manage Contributors  Recent Flowsheet Documentation  Taken 4/7/2024 0000 by Jeannine Zambrano RN  Medication Review/Management: medications reviewed  Taken 4/6/2024 2200 by Jeannine Zambrano RN  Medication Review/Management: medications reviewed  Taken 4/6/2024 2000 by Jeannine Zambrano RN  Medication Review/Management: medications reviewed  Intervention: Promote Injury-Free Environment  Recent Flowsheet Documentation  Taken 4/7/2024 0400 by Jeannine Zambrano RN  Safety Promotion/Fall Prevention:   assistive device/personal items within reach   clutter free environment maintained   fall prevention program maintained   nonskid shoes/slippers when out of bed   room organization consistent   safety round/check completed  Taken 4/7/2024 0000 by Jeannine Zambrano RN  Safety Promotion/Fall Prevention:   assistive device/personal items within reach   clutter free environment maintained   fall prevention program maintained   nonskid shoes/slippers when out of  bed   room organization consistent   safety round/check completed  Taken 4/6/2024 2200 by Jeannine Zambrano, RN  Safety Promotion/Fall Prevention:   assistive device/personal items within reach   clutter free environment maintained   fall prevention program maintained   nonskid shoes/slippers when out of bed   room organization consistent   safety round/check completed  Taken 4/6/2024 2000 by Jeannine Zambrano, RN  Safety Promotion/Fall Prevention:   assistive device/personal items within reach   clutter free environment maintained   fall prevention program maintained   nonskid shoes/slippers when out of bed   room organization consistent   safety round/check completed   Goal Outcome Evaluation:

## 2024-04-08 ENCOUNTER — APPOINTMENT (OUTPATIENT)
Dept: ULTRASOUND IMAGING | Facility: HOSPITAL | Age: 44
End: 2024-04-08
Payer: COMMERCIAL

## 2024-04-08 VITALS
TEMPERATURE: 98.3 F | RESPIRATION RATE: 16 BRPM | SYSTOLIC BLOOD PRESSURE: 115 MMHG | HEIGHT: 69 IN | WEIGHT: 114 LBS | OXYGEN SATURATION: 97 % | DIASTOLIC BLOOD PRESSURE: 71 MMHG | BODY MASS INDEX: 16.88 KG/M2 | HEART RATE: 64 BPM

## 2024-04-08 PROBLEM — E44.0 MODERATE MALNUTRITION: Status: ACTIVE | Noted: 2024-04-08

## 2024-04-08 LAB
CARDIOLIPIN IGG SER IA-ACNC: <9 GPL U/ML (ref 0–14)
CARDIOLIPIN IGM SER IA-ACNC: 13 MPL U/ML (ref 0–12)
F5 GENE MUT ANL BLD/T: NORMAL
FACTOR II, DNA ANALYSIS: NORMAL
UFH PPP CHRO-ACNC: 0.39 IU/ML (ref 0.3–0.7)

## 2024-04-08 PROCEDURE — 25010000002 ONDANSETRON PER 1 MG: Performed by: INTERNAL MEDICINE

## 2024-04-08 PROCEDURE — 99239 HOSP IP/OBS DSCHRG MGMT >30: CPT | Performed by: HOSPITALIST

## 2024-04-08 PROCEDURE — 93976 VASCULAR STUDY: CPT

## 2024-04-08 PROCEDURE — 76830 TRANSVAGINAL US NON-OB: CPT

## 2024-04-08 PROCEDURE — G0378 HOSPITAL OBSERVATION PER HR: HCPCS

## 2024-04-08 PROCEDURE — 85520 HEPARIN ASSAY: CPT

## 2024-04-08 PROCEDURE — 99232 SBSQ HOSP IP/OBS MODERATE 35: CPT | Performed by: HOSPITALIST

## 2024-04-08 PROCEDURE — 96366 THER/PROPH/DIAG IV INF ADDON: CPT

## 2024-04-08 PROCEDURE — 25010000002 PIPERACILLIN SOD-TAZOBACTAM PER 1 G: Performed by: OBSTETRICS & GYNECOLOGY

## 2024-04-08 PROCEDURE — 96376 TX/PRO/DX INJ SAME DRUG ADON: CPT

## 2024-04-08 RX ADMIN — APIXABAN 10 MG: 5 TABLET, FILM COATED ORAL at 09:20

## 2024-04-08 RX ADMIN — PIPERACILLIN AND TAZOBACTAM 4.5 G: 4; .5 INJECTION, POWDER, FOR SOLUTION INTRAVENOUS at 02:16

## 2024-04-08 RX ADMIN — ONDANSETRON 4 MG: 2 INJECTION INTRAMUSCULAR; INTRAVENOUS at 10:21

## 2024-04-08 RX ADMIN — TOPIRAMATE 100 MG: 25 CAPSULE, COATED PELLETS ORAL at 10:21

## 2024-04-08 RX ADMIN — HYDROCORTISONE 1 APPLICATION: 1 OINTMENT TOPICAL at 09:21

## 2024-04-08 RX ADMIN — PIPERACILLIN AND TAZOBACTAM 4.5 G: 4; .5 INJECTION, POWDER, FOR SOLUTION INTRAVENOUS at 09:21

## 2024-04-08 RX ADMIN — GABAPENTIN 800 MG: 400 CAPSULE ORAL at 09:20

## 2024-04-08 RX ADMIN — DULOXETINE HYDROCHLORIDE 60 MG: 60 CAPSULE, DELAYED RELEASE ORAL at 09:20

## 2024-04-08 RX ADMIN — Medication 10 ML: at 09:22

## 2024-04-08 NOTE — PLAN OF CARE
Problem: Adult Inpatient Plan of Care  Goal: Plan of Care Review  Outcome: Ongoing, Progressing  Goal: Patient-Specific Goal (Individualized)  Outcome: Ongoing, Progressing  Goal: Absence of Hospital-Acquired Illness or Injury  Outcome: Ongoing, Progressing  Intervention: Identify and Manage Fall Risk  Recent Flowsheet Documentation  Taken 4/8/2024 0400 by Jeannine Zambrano RN  Safety Promotion/Fall Prevention:   assistive device/personal items within reach   clutter free environment maintained   fall prevention program maintained   nonskid shoes/slippers when out of bed   room organization consistent   safety round/check completed  Taken 4/8/2024 0000 by Jeannine Zambrano RN  Safety Promotion/Fall Prevention:   assistive device/personal items within reach   clutter free environment maintained   fall prevention program maintained   nonskid shoes/slippers when out of bed   room organization consistent   safety round/check completed  Taken 4/7/2024 2000 by Jeannine Zambrano RN  Safety Promotion/Fall Prevention:   assistive device/personal items within reach   clutter free environment maintained   fall prevention program maintained   nonskid shoes/slippers when out of bed   room organization consistent   safety round/check completed  Intervention: Prevent Skin Injury  Recent Flowsheet Documentation  Taken 4/8/2024 0400 by Jeannine Zambrano RN  Body Position:   position changed independently   weight shifting  Skin Protection:   tubing/devices free from skin contact   transparent dressing maintained   skin-to-device areas padded  Taken 4/8/2024 0000 by Jeannine Zambrano RN  Body Position:   position changed independently   weight shifting  Skin Protection:   tubing/devices free from skin contact   transparent dressing maintained   skin-to-device areas padded  Taken 4/7/2024 2000 by Jeannine Zambrano RN  Body Position:   position changed independently   weight shifting  Skin Protection:   tubing/devices free from skin  contact   transparent dressing maintained   skin-to-device areas padded  Intervention: Prevent and Manage VTE (Venous Thromboembolism) Risk  Recent Flowsheet Documentation  Taken 4/8/2024 0400 by Jeannine Zambrano RN  Activity Management: activity encouraged  Taken 4/8/2024 0000 by Jeannine Zambrano RN  Activity Management: activity encouraged  Taken 4/7/2024 2000 by Jeannine Zambrano RN  Activity Management: activity encouraged  Range of Motion: active ROM (range of motion) encouraged  Intervention: Prevent Infection  Recent Flowsheet Documentation  Taken 4/8/2024 0400 by Jeannine Zambrano RN  Infection Prevention:   environmental surveillance performed   hand hygiene promoted   personal protective equipment utilized   single patient room provided  Taken 4/8/2024 0000 by Jeannine Zambrano RN  Infection Prevention:   environmental surveillance performed   hand hygiene promoted   personal protective equipment utilized   single patient room provided  Taken 4/7/2024 2000 by Jeannine Zambrano RN  Infection Prevention:   environmental surveillance performed   hand hygiene promoted   personal protective equipment utilized   single patient room provided  Goal: Optimal Comfort and Wellbeing  Outcome: Ongoing, Progressing  Intervention: Provide Person-Centered Care  Recent Flowsheet Documentation  Taken 4/7/2024 2000 by Jeannine Zambrano RN  Trust Relationship/Rapport:   care explained   choices provided   emotional support provided   empathic listening provided   questions answered   questions encouraged   reassurance provided   thoughts/feelings acknowledged  Goal: Readiness for Transition of Care  Outcome: Ongoing, Progressing     Problem: Fall Injury Risk  Goal: Absence of Fall and Fall-Related Injury  Outcome: Ongoing, Progressing  Intervention: Identify and Manage Contributors  Recent Flowsheet Documentation  Taken 4/7/2024 2000 by Jeannine Zambrano RN  Medication Review/Management: medications reviewed  Intervention:  Promote Injury-Free Environment  Recent Flowsheet Documentation  Taken 4/8/2024 0400 by Jeannine Zambrano, RN  Safety Promotion/Fall Prevention:   assistive device/personal items within reach   clutter free environment maintained   fall prevention program maintained   nonskid shoes/slippers when out of bed   room organization consistent   safety round/check completed  Taken 4/8/2024 0000 by Jeannine Zambrano, RN  Safety Promotion/Fall Prevention:   assistive device/personal items within reach   clutter free environment maintained   fall prevention program maintained   nonskid shoes/slippers when out of bed   room organization consistent   safety round/check completed  Taken 4/7/2024 2000 by Jeannine Zambrano, RN  Safety Promotion/Fall Prevention:   assistive device/personal items within reach   clutter free environment maintained   fall prevention program maintained   nonskid shoes/slippers when out of bed   room organization consistent   safety round/check completed   Goal Outcome Evaluation:

## 2024-04-08 NOTE — SIGNIFICANT NOTE
04/08/24 1237   SLP Deferred Reason   SLP Deferred Reason   (Informed by Radiology (as was sending for MBS + esophageal scan) that pt in midst of d/c process. No urgent needs for MBS. Symptoms more c/w gastroesophageal function and from review, already being followed by GI with plans for upcoming EGD. Thanks)

## 2024-04-08 NOTE — PROGRESS NOTES
[facility] Medicine Services  PROGRESS NOTE    Patient Name: Radha Florence  : 1980  MRN: [id number]    Date of Admission: 2024  Primary Care Physician: Chantel Baker MD    Subjective   Subjective     CC: R abdominal pain    HPI: Pain lower now in pelvis. RLQ pain. No bleeding.       Objective   Objective     Vital Signs:   Temp:  [97.7 °F (36.5 °C)-97.8 °F (36.6 °C)] 97.8 °F (36.6 °C)  Heart Rate:  [63-64] 64  Resp:  [16] 16  BP: ()/(58-64) 86/64     Physical Exam:  NAD, alert and oriented  OP clear, dry MM  Neck supple  No LAD  RRR  CTAB  +BS, soft  TTP RLQ    Results Reviewed:  LAB RESULTS:      Lab 24  0347 24  1017 24  0444 24  1922 24  1628 24  1534   WBC  --   --   --  4.64  --  6.43 5.03   HEMOGLOBIN  --   --   --  12.1  --  13.3 13.6   HEMATOCRIT  --   --   --  38.3  --  41.9 42.7   PLATELETS  --   --   --  147  --  187 151*   NEUTROS ABS  --   --   --  2.32  --  3.82 2.97   IMMATURE GRANS (ABS)  --   --   --  0.01  --  0.02 0.01   LYMPHS ABS  --   --   --  1.72  --  2.02 1.57   MONOS ABS  --   --   --  0.41  --  0.45 0.37   EOS ABS  --   --   --  0.15  --  0.09 0.09   MCV  --   --   --  96.2  --  94.2 96   SED RATE  --   --   --   --   --  2  --    CRP  --   --   --   --   --  <0.30  --    PROTIME  --   --   --   --   --  14.0  --    APTT  --   --   --   --   --  28.2*  --    HEPARIN ANTI-XA 0.39 0.43 0.53 0.51 0.10*  --   --    D DIMER QUANT  --   --   --   --   --  0.42  --          Lab 24  0444 24  1628 24  1534   SODIUM 142 141  --    POTASSIUM 5.0 3.4*  --    CHLORIDE 112* 107  --    CO2 21.0* 25.0  --    ANION GAP 9.0 9.0  --    BUN 13 15  --    CREATININE 0.74 0.80  --    EGFR 103.1 93.9  --    GLUCOSE 80 102*  --    CALCIUM 8.3* 9.1  --    MAGNESIUM  --  2.0 2.0         Lab 24  1628   TOTAL PROTEIN 7.4   ALBUMIN 4.7   GLOBULIN 2.7   ALT (SGPT) 9   AST (SGOT) 17   BILIRUBIN 0.5    ALK PHOS 49         Lab 04/06/24  1628   HSTROP T <6   PROTIME 14.0   INR 1.07             Lab 04/01/24  1534   IRON 104   IRON SATURATION 30   TIBC 348   TRANSFERRIN 278   FERRITIN 50   VITAMIN B 12 >2,000*         Brief Urine Lab Results  (Last result in the past 365 days)        Color   Clarity   Blood   Leuk Est   Nitrite   Protein   CREAT   Urine HCG        04/06/24 1627 Yellow   Cloudy   Negative   Negative   Negative   Negative                   Microbiology Results Abnormal       Procedure Component Value - Date/Time    COVID PRE-OP / PRE-PROCEDURE SCREENING ORDER (NO ISOLATION) - Swab, Nasopharynx [[id number]]  (Normal) Collected: 04/07/24 0001    Lab Status: Final result Specimen: Swab from Nasopharynx Updated: 04/07/24 0024    Narrative:      The following orders were created for panel order COVID PRE-OP / PRE-PROCEDURE SCREENING ORDER (NO ISOLATION) - Swab, Nasopharynx.  Procedure                               Abnormality         Status                     ---------                               -----------         ------                     COVID-19 and FLU A/B PCR...[[id number]]  Normal              Final result                 Please view results for these tests on the individual orders.    COVID-19 and FLU A/B PCR, 1 HR TAT - Swab, Nasopharynx [[id number]]  (Normal) Collected: 04/07/24 0001    Lab Status: Final result Specimen: Swab from Nasopharynx Updated: 04/07/24 0024     COVID19 Not Detected     Influenza A PCR Not Detected     Influenza B PCR Not Detected    Narrative:      Fact sheet for providers: https://www.fda.gov/media/475988/download    Fact sheet for patients: https://www.fda.gov/media/559992/download    Test performed by PCR.            Duplex Venous Lower Extremity - Bilateral CAR    Result Date: 4/7/2024    No evidence of DVT or SVT in the right or left lower extremities     US Gallbladder    Result Date: 4/6/2024  US GALLBLADDER Date of Exam: 4/6/2024 6:57 PM EDT Indication: ruq abd  pain. Comparison: CT abdomen and pelvis with contrast 4/6/2024 Technique: Grayscale and color Doppler ultrasound evaluation of the right upper quadrant was performed. Findings: Visualized portions of the pancreatic parenchyma demonstrate normal echogenicity, bowel gas shadowing limits complete pancreatic parenchymal assessment. The background liver echogenicity is within normal limits for technique. No perihepatic ascites. There is hepatopetal flow in the portal vein. The visualized hepatic veins are patent. The gallbladder is present. Negative for cholelithiasis. No pericholecystic fluid. Normal caliber common bile duct measuring 5 mm. The right kidney measures 9.8 x 4.6 x 4.2 cm. No right-sided hydronephrosis.     Impression: Impression: Normal exam. Electronically Signed: Ignacio Ponce MD  4/6/2024 7:24 PM EDT  Workstation ID: [id number]    XR Chest 1 View    Result Date: 4/6/2024  XR CHEST 1 VW Date of Exam: 4/6/2024 6:18 PM EDT Indication: Weak/Dizzy/AMS triage protocol Comparison: 6/18/2023 Findings: The cardiomediastinal silhouette is within normal limits. Lungs are clear. No focal consolidation, pneumothorax, or significant pleural effusion. Osseous structures grossly intact.     Impression: Impression: No acute process. Electronically Signed: Ignacio Ponce MD  4/6/2024 6:42 PM EDT  Workstation ID: [id number]    CT Abdomen Pelvis With Contrast    Result Date: 4/6/2024  CT ABDOMEN PELVIS W CONTRAST Date of Exam: 4/6/2024 5:32 PM EDT Indication: ruq abd pain. Comparison: None available. Technique: Axial CT images were obtained of the abdomen and pelvis following the uneventful intravenous administration of 90 cc Isovue-300 . Reconstructed coronal and sagittal images were also obtained. Automated exposure control and iterative construction methods were used. FINDINGS: Lung bases: No masses. No consolidation. Liver:No masses. No intrahepatic biliary ductal dilatation. Spleen:No masses. No perisplenic hematoma.  Pancreas:No pancreatic masses. No evidence of pancreatitis. Gallbladder and common bile duct:No evidence of cholelithiasis. No evidence of cholecystitis. Adrenal glands:No adrenal masses Kidneys and ureters:No kidney stones. No renal masses.No calculi present within the ureters. Normal caliber ureters. Urinary bladder:No urinary bladder wall thickening. No bladder masses. Small bowel:Normal caliber small bowel. Large bowel: Extensive colonic diverticulosis without evidence of diverticulitis. Appendix: Not seen however there is no evidence of appendicitis GENITOURINARY: Right corpus luteum cyst. Ascites or pneumoperitoneum:None. Adenopathy:None present Osseous structures:Normal Other findings: Thrombosis within a focally dilated right gonadal vein. The gonadal vein measures up to 1.4 cm.     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 No surrounding adenopathy. Normal appearance of the gallbladder. No rib fractures. Electronically Signed: Palmer Boo MD  4/6/2024 6:12 PM EDT  Workstation ID: [id number]         Current medications:  Scheduled Meds:apixaban, 10 mg, Oral, Q12H   Followed by  [START ON 4/15/2024] apixaban, 5 mg, Oral, Q12H  DULoxetine, 60 mg, Oral, Daily  gabapentin, 800 mg, Oral, Daily  hydrocortisone, 1 Application, Topical, Q12H  piperacillin-tazobactam, 4.5 g, Intravenous, Q8H  sodium chloride, 10 mL, Intravenous, Q12H  topiramate, 100 mg, Oral, Daily      Continuous Infusions:     PRN Meds:.  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        Brief Hospital Course to date:  Radha Florence is a 43 y.o. 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     R Gonadal/Ovarain Vein Thrombosis:  AUB, LGSIL on Pap s/p YOLA 12/22/2022 at   -rare, incidental finding, with unclear etiology  -CT A/P, GB US reviewed  -d/w radiology, abnormal ovarian findings, recommended US for better characterization  -duplex negative  -transitioned to Eliquis  -coagulopathy serology ordered  -GYN/hematology consulted/following    Hx of migraines  -resumed home meds  Expected Discharge Location and Transportation: Home  Expected Discharge 4/8?  Expected discharge date/ time has not been documented.     DVT prophylaxis:  Medical DVT prophylaxis orders are present.         AM-PAC 6 Clicks Score (PT): 24 (04/07/24 2000)    CODE STATUS:   Code Status and Medical Interventions:   Ordered at: 04/06/24 1951     Level Of Support Discussed With:    Patient     Code Status (Patient has no pulse and is not breathing):    CPR (Attempt to Resuscitate)     Medical Interventions (Patient has pulse or is breathing):    Full Support       Dion Comer MD  04/08/24

## 2024-04-08 NOTE — CASE MANAGEMENT/SOCIAL WORK
Discharge Planning Assessment  [facility]     Patient Name: Radha Gomez  MRN: [id number]  Today's Date: 4/8/2024    Admit Date: 4/6/2024    Plan: home   Discharge Needs Assessment    No documentation.                  Discharge Plan       Row Name 04/08/24 1001       Plan    Plan home    Patient/Family in Agreement with Plan yes    Plan Comments Met with Mrs. Gomez at the bedside to initiate discharge planning. She shares a home in [place] with her  and children. She is independent with activities of daily living and works. She has no DME, home health, or outpatient services. Her primary care provider is Dr. Chantel Baker. She denies problems with accessing medical care or obtaining medications. Her discharge plan is home with family, and her  will transport. No discharge needs were identified today.  will continue to follow plan of care and assist with discharge planning needs as indicated.    Final Discharge Disposition Code 01 - home or self-care                  Continued Care and Services - Admitted Since 4/6/2024    No active coordination exists for this encounter.          Demographic Summary       Row Name 04/08/24 1000       General Information    Admission Type observation    Arrived From home    Referral Source admission list    Reason for Consult discharge planning    Preferred Language English       Contact Information    Permission Granted to Share Info With     Contact Information Obtained for     Contact Information Comments MOHAN GOMEZ Spouse [phone]                   Functional Status       Row Name 04/08/24 1000       Functional Status    Usual Activity Tolerance excellent    Current Activity Tolerance other (see comments)  pino       Physical Activity    On average, how many days per week do you engage in moderate to strenuous exercise (like a brisk walk)? 5 days    On average, how many minutes do you engage in exercise at this  level? 40 min    Number of minutes of exercise per week 200       Assessment of Health Literacy    How often do you have someone help you read hospital materials? Never    How often do you have problems learning about your medical condition because of difficulty understanding written information? Never    How often do you have a problem understanding what is told to you about your medical condition? Never    How confident are you filling out medical forms by yourself? Extremely    Health Literacy Good       Functional Status, IADL    Medications independent    Meal Preparation independent    Housekeeping independent    Laundry independent    Shopping independent       Mental Status    General Appearance WDL WDL       Mental Status Summary    Recent Changes in Mental Status/Cognitive Functioning no changes       Employment/    Employment Status employed full-time                   Psychosocial    No documentation.                  Abuse/Neglect    No documentation.                  Legal    No documentation.                  Substance Abuse    No documentation.                  Patient Forms    No documentation.                     Anabell Alonso RN

## 2024-04-08 NOTE — DISCHARGE SUMMARY
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          Hospital Course:  Radha Florence is a 43 y.o. female with PMHx significant for migraines, AUB and LGSIL pap s/p YOLA 2022 at  who presents to [facility] with vague abdominal pain and RUQ back pain found to have incidental gonadal/ovarian vein thrombus.     R Gonadal/Ovarain Vein Thrombosis:  AUB, LGSIL on Pap s/p YOLA 2022 at   -rare, incidental finding, with unclear etiology  -CT A/P, GB US reviewed  -Pelvic US demonstrated a complex ovarian cyst on R  -duplex negative for DVT in LE  -She was evaluated by hematology and coagulopathy studies were sent and she will follow up with hematology, Dr. Harvey in 1-2 weeks. She was evaluated by GYN and pelvic US demonstrated suspected thrombosis and a complex ovarian cyst and needs surveillance with repeat imaging in 4-6 weeks and will follow up with Dr. Jeannine Finley.     Hx of migraines  -resumed home meds  Discharge Follow Up Recommendations for outpatient labs/diagnostics:  PCP 1 week  H/OWes 1-2 weeks  Jeannine Finley/GYN 4-6 weeks with US    Day of Discharge     HPI: Pain lower now in pelvis. RLQ pain. No bleeding.      Objective  Objective      Vital Signs:   Temp:  [97.7 °F (36.5 °C)-97.8 °F (36.6 °C)] 97.8 °F (36.6 °C)  Heart Rate:  [63-64]  64  Resp:  [16] 16  BP: ()/(58-64) 86/64     Physical Exam:  NAD, alert and oriented  OP clear, dry MM  Neck supple  No LAD  RRR  CTAB  +BS, soft  TTP RLQ    Pertinent  and/or Most Recent Results     LAB RESULTS:      Lab 04/08/24 0347 04/07/24 2037 04/07/24  1017 04/07/24 0444 04/06/24  1922 04/06/24  1628 04/01/24  1534   WBC  --   --   --  4.64  --  6.43 5.03   HEMOGLOBIN  --   --   --  12.1  --  13.3 13.6   HEMATOCRIT  --   --   --  38.3  --  41.9 42.7   PLATELETS  --   --   --  147  --  187 151*   NEUTROS ABS  --   --   --  2.32  --  3.82 2.97   IMMATURE GRANS (ABS)  --   --   --  0.01  --  0.02 0.01   LYMPHS ABS  --   --   --  1.72  --  2.02 1.57   MONOS ABS  --   --   --  0.41  --  0.45 0.37   EOS ABS  --   --   --  0.15  --  0.09 0.09   MCV  --   --   --  96.2  --  94.2 96   SED RATE  --   --   --   --   --  2  --    CRP  --   --   --   --   --  <0.30  --    PROTIME  --   --   --   --   --  14.0  --    APTT  --   --   --   --   --  28.2*  --    HEPARIN ANTI-XA 0.39 0.43 0.53 0.51 0.10*  --   --    D DIMER QUANT  --   --   --   --   --  0.42  --          Lab 04/07/24 0444 04/06/24 1628 04/01/24  1534   SODIUM 142 141  --    POTASSIUM 5.0 3.4*  --    CHLORIDE 112* 107  --    CO2 21.0* 25.0  --    ANION GAP 9.0 9.0  --    BUN 13 15  --    CREATININE 0.74 0.80  --    EGFR 103.1 93.9  --    GLUCOSE 80 102*  --    CALCIUM 8.3* 9.1  --    MAGNESIUM  --  2.0 2.0         Lab 04/06/24  1628   TOTAL PROTEIN 7.4   ALBUMIN 4.7   GLOBULIN 2.7   ALT (SGPT) 9   AST (SGOT) 17   BILIRUBIN 0.5   ALK PHOS 49         Lab 04/06/24  1628   HSTROP T <6   PROTIME 14.0   INR 1.07             Lab 04/01/24  1534   IRON 104   IRON SATURATION 30   TIBC 348   TRANSFERRIN 278   FERRITIN 50   VITAMIN B 12 >2,000*         Brief Urine Lab Results  (Last result in the past 365 days)        Color   Clarity   Blood   Leuk Est   Nitrite   Protein   CREAT   Urine HCG        04/06/24 1627 Yellow   Cloudy   Negative   Negative    Negative   Negative                 Microbiology Results (last 10 days)       Procedure Component Value - Date/Time    COVID PRE-OP / PRE-PROCEDURE SCREENING ORDER (NO ISOLATION) - Swab, Nasopharynx [[id number]]  (Normal) Collected: 04/07/24 0001    Lab Status: Final result Specimen: Swab from Nasopharynx Updated: 04/07/24 0024    Narrative:      The following orders were created for panel order COVID PRE-OP / PRE-PROCEDURE SCREENING ORDER (NO ISOLATION) - Swab, Nasopharynx.  Procedure                               Abnormality         Status                     ---------                               -----------         ------                     COVID-19 and FLU A/B PCR...[[id number]]  Normal              Final result                 Please view results for these tests on the individual orders.    COVID-19 and FLU A/B PCR, 1 HR TAT - Swab, Nasopharynx [[id number]]  (Normal) Collected: 04/07/24 0001    Lab Status: Final result Specimen: Swab from Nasopharynx Updated: 04/07/24 0024     COVID19 Not Detected     Influenza A PCR Not Detected     Influenza B PCR Not Detected    Narrative:      Fact sheet for providers: https://www.fda.gov/media/426912/download    Fact sheet for patients: https://www.fda.gov/media/421075/download    Test performed by PCR.            US Non-ob Transvaginal    Result Date: 4/8/2024  US NON-OB TRANSVAGINAL, US TESTICULAR OR OVARIAN VASCULAR LIMITED Date of Exam: 4/8/2024 9:32 AM EDT Indication: R gonadal vein/ovarain vein thrombosis, possible ovarian cyst/mass, d/w Dr. Murphy, and can change to transvaginal US if needed to assess for better imaging, thanks!. Comparison: CT abdomen pelvis 4/6/2024 Technique: Transvaginal pelvic ultrasound was performed.  Grayscale and color Doppler imaging was utilized to evaluate the pelvic area with image documentation per protocol.  Doppler spectral analysis was performed. FINDINGS: The patient is status post hysterectomy. The right ovary measures 2.8 x 1.8 x  2.0 cm. There is a complex hypoechoic structure adjacent or extending from the right ovary. Findings may represent atypical hemorrhagic cyst or portions of the right gonadal vein with underlying thrombus given appearance of comparison CT. The adjacent ovarian parenchyma demonstrates preserved Doppler arterial and venous waveforms. The left adnexa appears grossly unremarkable in appearance. Left ovary is not visualized     1. Heterogeneous hypoechoic structure adjacent to or arising from the right ovary. Findings could represent an atypical hemorrhagic cyst or more likely partial thrombus within a dilated right gonadal vein given the appearance of the comparison CT. The ovarian parenchyma itself demonstrates preserved blood flow. 2. Nonvisualization of the left ovary 3. Status post hysterectomy Electronically Signed: Andrea Schwarz MD  4/8/2024 10:08 AM EDT  Workstation ID: [id number]    US Testicular or Ovarian Vascular Limited    Result Date: 4/8/2024  US NON-OB TRANSVAGINAL, US TESTICULAR OR OVARIAN VASCULAR LIMITED Date of Exam: 4/8/2024 9:32 AM EDT Indication: R gonadal vein/ovarain vein thrombosis, possible ovarian cyst/mass, d/w Dr. Murphy, and can change to transvaginal US if needed to assess for better imaging, thanks!. Comparison: CT abdomen pelvis 4/6/2024 Technique: Transvaginal pelvic ultrasound was performed.  Grayscale and color Doppler imaging was utilized to evaluate the pelvic area with image documentation per protocol.  Doppler spectral analysis was performed. FINDINGS: The patient is status post hysterectomy. The right ovary measures 2.8 x 1.8 x 2.0 cm. There is a complex hypoechoic structure adjacent or extending from the right ovary. Findings may represent atypical hemorrhagic cyst or portions of the right gonadal vein with underlying thrombus given appearance of comparison CT. The adjacent ovarian parenchyma demonstrates preserved Doppler arterial and venous waveforms. The left adnexa appears  grossly unremarkable in appearance. Left ovary is not visualized     1. Heterogeneous hypoechoic structure adjacent to or arising from the right ovary. Findings could represent an atypical hemorrhagic cyst or more likely partial thrombus within a dilated right gonadal vein given the appearance of the comparison CT. The ovarian parenchyma itself demonstrates preserved blood flow. 2. Nonvisualization of the left ovary 3. Status post hysterectomy Electronically Signed: Andrea Schwarz MD  4/8/2024 10:08 AM EDT  Workstation ID: [id number]    Duplex Venous Lower Extremity - Bilateral CAR    Result Date: 4/7/2024    No evidence of DVT or SVT in the right or left lower extremities     US Gallbladder    Result Date: 4/6/2024  US GALLBLADDER Date of Exam: 4/6/2024 6:57 PM EDT Indication: ruq abd pain. Comparison: CT abdomen and pelvis with contrast 4/6/2024 Technique: Grayscale and color Doppler ultrasound evaluation of the right upper quadrant was performed. Findings: Visualized portions of the pancreatic parenchyma demonstrate normal echogenicity, bowel gas shadowing limits complete pancreatic parenchymal assessment. The background liver echogenicity is within normal limits for technique. No perihepatic ascites. There is hepatopetal flow in the portal vein. The visualized hepatic veins are patent. The gallbladder is present. Negative for cholelithiasis. No pericholecystic fluid. Normal caliber common bile duct measuring 5 mm. The right kidney measures 9.8 x 4.6 x 4.2 cm. No right-sided hydronephrosis.     Impression: Normal exam. Electronically Signed: Ignacio Ponce MD  4/6/2024 7:24 PM EDT  Workstation ID: [id number]    XR Chest 1 View    Result Date: 4/6/2024  XR CHEST 1 VW Date of Exam: 4/6/2024 6:18 PM EDT Indication: Weak/Dizzy/AMS triage protocol Comparison: 6/18/2023 Findings: The cardiomediastinal silhouette is within normal limits. Lungs are clear. No focal consolidation, pneumothorax, or significant pleural effusion.  Osseous structures grossly intact.     Impression: No acute process. Electronically Signed: Ignacio Ponce MD  4/6/2024 6:42 PM EDT  Workstation ID: [id number]    CT Abdomen Pelvis With Contrast    Result Date: 4/6/2024  CT ABDOMEN PELVIS W CONTRAST Date of Exam: 4/6/2024 5:32 PM EDT Indication: ruq abd pain. Comparison: None available. Technique: Axial CT images were obtained of the abdomen and pelvis following the uneventful intravenous administration of 90 cc Isovue-300 . Reconstructed coronal and sagittal images were also obtained. Automated exposure control and iterative construction methods were used. FINDINGS: Lung bases: No masses. No consolidation. Liver:No masses. No intrahepatic biliary ductal dilatation. Spleen:No masses. No perisplenic hematoma. Pancreas:No pancreatic masses. No evidence of pancreatitis. Gallbladder and common bile duct:No evidence of cholelithiasis. No evidence of cholecystitis. Adrenal glands:No adrenal masses Kidneys and ureters:No kidney stones. No renal masses.No calculi present within the ureters. Normal caliber ureters. Urinary bladder:No urinary bladder wall thickening. No bladder masses. Small bowel:Normal caliber small bowel. Large bowel: Extensive colonic diverticulosis without evidence of diverticulitis. Appendix: Not seen however there is no evidence of appendicitis GENITOURINARY: Right corpus luteum cyst. Ascites or pneumoperitoneum:None. Adenopathy:None present Osseous structures:Normal Other findings: Thrombosis within a focally dilated right gonadal vein. The gonadal vein measures up to 1.4 cm.     Thrombosis within a dilated right gonadal vein. No surrounding adenopathy. Normal appearance of the gallbladder. No rib fractures. Electronically Signed: Palmer Boo MD  4/6/2024 6:12 PM EDT  Workstation ID: [id number]     Results for orders placed during the hospital encounter of 04/06/24    Duplex Venous Lower Extremity - Bilateral CAR    Interpretation Summary    No evidence of  DVT or SVT in the right or left lower extremities      Results for orders placed during the hospital encounter of 04/06/24    Duplex Venous Lower Extremity - Bilateral CAR    Interpretation Summary    No evidence of DVT or SVT in the right or left lower extremities          Plan for Follow-up of Pending Labs/Results: Reviewed  Pending Labs       Order Current Status    KENYETTA Comprehensive Panel In process    Anticardiolipin Antibody, IgG / M, Qn In process    Antithrombin III In process    Beta-2 Glycoprotein Antibodies In process    Chlamydia trachomatis, Neisseria gonorrhoeae, PCR - Urine, Urine, Clean Catch In process    Lupus Anticoagulant In process    Phosphatidylserine Antibodies In process    Protein C Activity In process    Protein C Antigen, Total In process    Protein S Antigen, Free In process    Protein S Antigen, Total In process    Protein S Functional In process          Discharge Details        Discharge Medications        New Medications        Instructions Start Date   apixaban 5 MG tablet tablet  Commonly known as: ELIQUIS   Take 2 tablets by mouth Every 12 (Twelve) Hours for 7 days, THEN 1 tablet Every 12 (Twelve) Hours for 30 days. Indications: DVT/PE (active thrombosis)   Start Date: April 8, 2024     PHARMACY MEDS TO BED CONSULT   Does not apply, Daily             Continue These Medications        Instructions Start Date   albuterol sulfate  (90 Base) MCG/ACT inhaler  Commonly known as: PROVENTIL HFA;VENTOLIN HFA;PROAIR HFA   As Needed.      DULoxetine 60 MG capsule  Commonly known as: CYMBALTA   60 mg, Oral, Daily      gabapentin 800 MG tablet  Commonly known as: NEURONTIN   800 mg, Oral, Daily      HYDROcodone-acetaminophen  MG per tablet  Commonly known as: NORCO   1 tablet, Oral, Every 6 Hours PRN      lidocaine 5 %  Commonly known as: LIDODERM   1 patch, Topical, As Needed      promethazine 25 MG tablet  Commonly known as: PHENERGAN   As Needed.      PSYLLIUM PO   1 packet,  Oral      SUMAtriptan 50 MG tablet  Commonly known as: IMITREX   50 mg, Oral, As Needed      tiZANidine 4 MG tablet  Commonly known as: ZANAFLEX   4 mg, Oral, Nightly PRN      topiramate 100 MG tablet  Commonly known as: TOPAMAX   100 mg, Oral, Daily             Stop These Medications      predniSONE 20 MG tablet  Commonly known as: DELTASONE              Allergies   Allergen Reactions    Vancomycin Other (See Comments)     Vancomycin infusion reaction after completion of administration; tolerated w/ slowed infusion rate  Red Man syndrome - not an allergy          Discharge Disposition:  Home or Self Care    Diet:  Hospital:  Diet Order   Procedures    Diet: Regular/House; Fluid Consistency: Thin (IDDSI 0)            Activity:      Restrictions or Other Recommendations:         CODE STATUS:    Code Status and Medical Interventions:   Ordered at: 04/06/24 1951     Level Of Support Discussed With:    Patient     Code Status (Patient has no pulse and is not breathing):    CPR (Attempt to Resuscitate)     Medical Interventions (Patient has pulse or is breathing):    Full Support       Future Appointments   Date Time Provider Department Center   12/19/2024  2:30 PM Palmer Suarez MD [facility]       Additional Instructions for the Follow-ups that You Need to Schedule       Discharge Follow-up with PCP   As directed       Currently Documented PCP:    Chantel Baker MD    PCP Phone Number:    [phone]     Follow Up Details: 1 week        Discharge Follow-up with Specified Provider: TRINIDAD Durham 4-6 weeks, with US for ovarian cyst; 1 Month   As directed      To: TRINIDAD Durham 4-6 weeks, with US for ovarian cyst   Follow Up: 1 Month        Discharge Follow-up with Specified Provider: Devyn Harvey 1-2 weeks, hematology   As directed      To: Devyn Harvey 1-2 weeks, hematology                      Dion Comer MD  04/08/24      Time Spent on Discharge:  I spent  40  minutes on this discharge  activity which included: face-to-face encounter with the patient, reviewing the data in the system, coordination of the care with the nursing staff as well as consultants, documentation, and entering orders.

## 2024-04-09 LAB
APTT SCREEN TO CONFIRM RATIO: 0.95 RATIO (ref 0–1.34)
AT III ACT/NOR PPP CHRO: 123 % (ref 75–135)
B2 GLYCOPROT1 IGA SER-ACNC: <9 GPI IGA UNITS (ref 0–25)
B2 GLYCOPROT1 IGG SER-ACNC: <9 GPI IGG UNITS (ref 0–20)
B2 GLYCOPROT1 IGM SER-ACNC: <9 GPI IGM UNITS (ref 0–32)
C TRACH RRNA SPEC QL NAA+PROBE: NEGATIVE
CENTROMERE B AB SER-ACNC: <0.2 AI (ref 0–0.9)
CHROMATIN AB SERPL-ACNC: <0.2 AI (ref 0–0.9)
CONFIRM APTT/NORMAL: 39.6 SEC (ref 0–47.6)
DSDNA AB SER-ACNC: <1 IU/ML (ref 0–9)
ENA JO1 AB SER-ACNC: <0.2 AI (ref 0–0.9)
ENA RNP AB SER-ACNC: <0.2 AI (ref 0–0.9)
ENA SCL70 AB SER-ACNC: <0.2 AI (ref 0–0.9)
ENA SM AB SER-ACNC: <0.2 AI (ref 0–0.9)
ENA SS-A AB SER-ACNC: <0.2 AI (ref 0–0.9)
ENA SS-B AB SER-ACNC: <0.2 AI (ref 0–0.9)
LA 2 SCREEN W REFLEX-IMP: NORMAL
Lab: NORMAL
N GONORRHOEA RRNA SPEC QL NAA+PROBE: NEGATIVE
PROT C ACT/NOR PPP: 96 % (ref 73–180)
PROT C AG ACT/NOR PPP IA: 66 % (ref 60–150)
PROT S ACT/NOR PPP: 89 % (ref 63–140)
PROT S AG ACT/NOR PPP IA: 56 % (ref 60–150)
PROT S FREE AG ACT/NOR PPP IA: 90 % (ref 61–136)
SCREEN APTT: 31.8 SEC (ref 0–43.5)
SCREEN DRVVT: 30.8 SEC (ref 0–47)
THROMBIN TIME: 18.9 SEC (ref 0–23)

## 2024-04-11 LAB
PS IGA SER-ACNC: 2 APS UNITS (ref 0–19)
PS IGG SER-ACNC: 10 UNITS (ref 0–30)
PS IGM SER-ACNC: 46 UNITS (ref 0–30)

## 2024-04-23 ENCOUNTER — OFFICE VISIT (OUTPATIENT)
Dept: ONCOLOGY | Facility: CLINIC | Age: 44
End: 2024-04-23
Payer: COMMERCIAL

## 2024-04-23 VITALS
SYSTOLIC BLOOD PRESSURE: 127 MMHG | BODY MASS INDEX: 17.58 KG/M2 | RESPIRATION RATE: 16 BRPM | HEART RATE: 61 BPM | OXYGEN SATURATION: 97 % | TEMPERATURE: 98.5 F | HEIGHT: 68 IN | DIASTOLIC BLOOD PRESSURE: 70 MMHG | WEIGHT: 116 LBS

## 2024-04-23 DIAGNOSIS — I82.90 ACUTE DEEP VEIN THROMBOSIS (DVT) OF NON-EXTREMITY VEIN: Primary | ICD-10-CM

## 2024-04-23 PROCEDURE — 99214 OFFICE O/P EST MOD 30 MIN: CPT | Performed by: INTERNAL MEDICINE

## 2024-04-23 RX ORDER — TRIAMCINOLONE ACETONIDE 1 MG/G
1 CREAM TOPICAL 2 TIMES DAILY
COMMUNITY
Start: 2024-04-04 | End: 2025-04-04

## 2024-04-23 RX ORDER — AMITRIPTYLINE HYDROCHLORIDE 100 MG/1
100 TABLET ORAL NIGHTLY
COMMUNITY
Start: 2024-02-12 | End: 2024-05-12

## 2024-04-23 RX ORDER — PSEUDOEPHEDRINE HCL 30 MG
100 TABLET ORAL 2 TIMES DAILY
COMMUNITY
Start: 2024-02-12 | End: 2024-05-12

## 2024-04-23 RX ORDER — LUBIPROSTONE 24 UG/1
24 CAPSULE ORAL 2 TIMES DAILY WITH MEALS
COMMUNITY
Start: 2024-03-05 | End: 2024-05-04

## 2024-04-23 RX ORDER — PANTOPRAZOLE SODIUM 40 MG/1
40 TABLET, DELAYED RELEASE ORAL DAILY
COMMUNITY
Start: 2024-02-12 | End: 2024-05-12

## 2024-04-23 NOTE — PROGRESS NOTES
Follow Up Office Visit      Date: 2024     Patient Name: Radha Florence  MRN: [id number]  : 1980  Referring Physician: Hospital follow-up    Chief Complaint: Follow-up for right gonadal vein thrombosis    History of Present Illness: Ms Florence is a 43-year-old lady with past medical history of migraines who presented [facility] with abdominal pain and back pain.  Notes that she has been having some fatigue and nausea over the past several weeks.  Developed some right-sided abdominal pain as well as a rash in her right axilla and inguinal region.  She underwent a CT scan on presentation which noted right gonadal vein thrombus.  She does note some leg pain and discomfort but states that this is improved with steroids in the past.  Denies any swelling, redness today.  Does note a family history of blood clots in her father.  She denies any long car rides or plane rides.  Denies any recent COVID infection or vaccination.     Interval History:  Presents clinic for follow-up.  Currently on apixaban and tolerating well.  Still having some slight right inguinal pain and discomfort but overall stable.  Rash not resolved.  As noted migraines have not been significant treated with her migraine medicine    Oncology History:    Oncology/Hematology History    No history exists.       Subjective      Review of Systems:   Constitutional: Negative for fevers, chills, or weight loss  Eyes: Negative for blurred vision or discharge         Ear/Nose/Throat: Negative for difficulty swallowing, sore throat, LAD                                                       Respiratory: Negative for cough, SOA, wheezing                                                                                        Cardiovascular: Negative for chest pain or palpitations                                                                  Gastrointestinal: Negative for nausea, vomiting or diarrhea                                                                      Genitourinary: Negative for dysuria or hematuria                                                                                           Musculoskeletal: Negative for any joint pains or muscle aches                                                                        Neurologic: Negative for any weakness, headaches, dizziness                                                                         Hematologic: Negative for any easy bleeding or bruising                                                                                   Psychiatric: Negative for anxiety or depression                          Past Medical History/Past Surgical History/ Family History/ Social History: Reviewed by me and unchanged from my previous documentation done on April 2024.     Medications:     Current Outpatient Medications:     albuterol sulfate  (90 Base) MCG/ACT inhaler, As Needed., Disp: , Rfl:     amitriptyline (ELAVIL) 100 MG tablet, Take 1 tablet by mouth Every Night., Disp: , Rfl:     docusate sodium 100 MG capsule, Take 1 capsule by mouth 2 (Two) Times a Day., Disp: , Rfl:     HYDROcodone-acetaminophen (NORCO)  MG per tablet, Take 1 tablet by mouth Every 6 (Six) Hours As Needed for Moderate Pain., Disp: , Rfl:     lidocaine (LIDODERM) 5 %, Apply 1 patch topically to the appropriate area as directed As Needed., Disp: , Rfl:     lubiprostone (AMITIZA) 24 MCG capsule, Take 1 capsule by mouth 2 (Two) Times a Day With Meals., Disp: , Rfl:     pantoprazole (PROTONIX) 40 MG EC tablet, Take 1 tablet by mouth Daily., Disp: , Rfl:     PHARMACY MEDS TO BED CONSULT, Use Daily., Disp: , Rfl:     promethazine (PHENERGAN) 25 MG tablet, As Needed., Disp: , Rfl:     PSYLLIUM PO, Take 1 packet by mouth., Disp: , Rfl:     tiZANidine (ZANAFLEX) 4 MG tablet, Take 1 tablet by mouth At Night As Needed for Muscle Spasms., Disp: , Rfl:     topiramate (TOPAMAX) 100 MG tablet, Take 1 tablet by mouth  "Daily., Disp: , Rfl:     triamcinolone (KENALOG) 0.1 % cream, Apply 1 Application topically to the appropriate area as directed 2 (Two) Times a Day., Disp: , Rfl:     apixaban (ELIQUIS) 5 MG tablet tablet, Take 1 tablet by mouth 2 (Two) Times a Day., Disp: 60 tablet, Rfl: 2    DULoxetine (CYMBALTA) 60 MG capsule, Take 1 capsule by mouth Daily., Disp: , Rfl:     gabapentin (NEURONTIN) 800 MG tablet, Take 1 tablet by mouth Daily., Disp: , Rfl:     SUMAtriptan (IMITREX) 50 MG tablet, Take 1 tablet by mouth As Needed., Disp: , Rfl:     Allergies:   Allergies   Allergen Reactions    Vancomycin Other (See Comments)     Vancomycin infusion reaction after completion of administration; tolerated w/ slowed infusion rate  Red Man syndrome - not an allergy        Objective     Physical Exam:  Vital Signs:   Vitals:    04/23/24 1001   BP: 127/70   Pulse: 61   Resp: 16   Temp: 98.5 °F (36.9 °C)   SpO2: 97%   Weight: 52.6 kg (116 lb)   Height: 172.7 cm (68\")   PainSc: 0-No pain     Pain Score    04/23/24 1001   PainSc: 0-No pain     ECOG Performance Status: 0 - Asymptomatic    Constitutional: NAD, ECOG 0  Eyes: PERRLA, scleral anicteric  ENT: No LAD, no thyromegaly  Respiratory: CTAB, no wheezing, rales, rhonchi  Cardiovascular: RRR, no murmurs, pulses 2+ bilaterally  Abdomen: soft, NT/ND, no HSM  Musculoskeletal: strength 5/5 bilaterally, no c/c/e  Neurologic: A&O x 3, CN II-XII intact grossly    Results Review:   No visits with results within 2 Week(s) from this visit.   Latest known visit with results is:   No results displayed because visit has over 200 results.          US Non-ob Transvaginal    Result Date: 4/8/2024  Narrative: US NON-OB TRANSVAGINAL, US TESTICULAR OR OVARIAN VASCULAR LIMITED Date of Exam: 4/8/2024 9:32 AM EDT Indication: R gonadal vein/ovarain vein thrombosis, possible ovarian cyst/mass, d/w Dr. Murphy, and can change to transvaginal US if needed to assess for better imaging, thanks!. Comparison: CT abdomen " pelvis 4/6/2024 Technique: Transvaginal pelvic ultrasound was performed.  Grayscale and color Doppler imaging was utilized to evaluate the pelvic area with image documentation per protocol.  Doppler spectral analysis was performed. FINDINGS: The patient is status post hysterectomy. The right ovary measures 2.8 x 1.8 x 2.0 cm. There is a complex hypoechoic structure adjacent or extending from the right ovary. Findings may represent atypical hemorrhagic cyst or portions of the right gonadal vein with underlying thrombus given appearance of comparison CT. The adjacent ovarian parenchyma demonstrates preserved Doppler arterial and venous waveforms. The left adnexa appears grossly unremarkable in appearance. Left ovary is not visualized     Impression: 1. Heterogeneous hypoechoic structure adjacent to or arising from the right ovary. Findings could represent an atypical hemorrhagic cyst or more likely partial thrombus within a dilated right gonadal vein given the appearance of the comparison CT. The ovarian parenchyma itself demonstrates preserved blood flow. 2. Nonvisualization of the left ovary 3. Status post hysterectomy Electronically Signed: Andrea Schwarz MD  4/8/2024 10:08 AM EDT  Workstation ID: [id number]    US Testicular or Ovarian Vascular Limited    Result Date: 4/8/2024  Narrative: US NON-OB TRANSVAGINAL, US TESTICULAR OR OVARIAN VASCULAR LIMITED Date of Exam: 4/8/2024 9:32 AM EDT Indication: R gonadal vein/ovarain vein thrombosis, possible ovarian cyst/mass, d/w Dr. Murphy, and can change to transvaginal US if needed to assess for better imaging, thanks!. Comparison: CT abdomen pelvis 4/6/2024 Technique: Transvaginal pelvic ultrasound was performed.  Grayscale and color Doppler imaging was utilized to evaluate the pelvic area with image documentation per protocol.  Doppler spectral analysis was performed. FINDINGS: The patient is status post hysterectomy. The right ovary measures 2.8 x 1.8 x 2.0 cm. There is  a complex hypoechoic structure adjacent or extending from the right ovary. Findings may represent atypical hemorrhagic cyst or portions of the right gonadal vein with underlying thrombus given appearance of comparison CT. The adjacent ovarian parenchyma demonstrates preserved Doppler arterial and venous waveforms. The left adnexa appears grossly unremarkable in appearance. Left ovary is not visualized     Impression: 1. Heterogeneous hypoechoic structure adjacent to or arising from the right ovary. Findings could represent an atypical hemorrhagic cyst or more likely partial thrombus within a dilated right gonadal vein given the appearance of the comparison CT. The ovarian parenchyma itself demonstrates preserved blood flow. 2. Nonvisualization of the left ovary 3. Status post hysterectomy Electronically Signed: Andrea Schwarz MD  4/8/2024 10:08 AM EDT  Workstation ID: [id number]    Duplex Venous Lower Extremity - Bilateral CAR    Result Date: 4/7/2024  Narrative:   No evidence of DVT or SVT in the right or left lower extremities     US Gallbladder    Result Date: 4/6/2024  Narrative: US GALLBLADDER Date of Exam: 4/6/2024 6:57 PM EDT Indication: ruq abd pain. Comparison: CT abdomen and pelvis with contrast 4/6/2024 Technique: Grayscale and color Doppler ultrasound evaluation of the right upper quadrant was performed. Findings: Visualized portions of the pancreatic parenchyma demonstrate normal echogenicity, bowel gas shadowing limits complete pancreatic parenchymal assessment. The background liver echogenicity is within normal limits for technique. No perihepatic ascites. There is hepatopetal flow in the portal vein. The visualized hepatic veins are patent. The gallbladder is present. Negative for cholelithiasis. No pericholecystic fluid. Normal caliber common bile duct measuring 5 mm. The right kidney measures 9.8 x 4.6 x 4.2 cm. No right-sided hydronephrosis.     Impression: Impression: Normal exam. Electronically  Signed: Ignacio Ponce MD  4/6/2024 7:24 PM EDT  Workstation ID: [id number]    XR Chest 1 View    Result Date: 4/6/2024  Narrative: XR CHEST 1 VW Date of Exam: 4/6/2024 6:18 PM EDT Indication: Weak/Dizzy/AMS triage protocol Comparison: 6/18/2023 Findings: The cardiomediastinal silhouette is within normal limits. Lungs are clear. No focal consolidation, pneumothorax, or significant pleural effusion. Osseous structures grossly intact.     Impression: Impression: No acute process. Electronically Signed: Ignacio Ponce MD  4/6/2024 6:42 PM EDT  Workstation ID: [id number]    CT Abdomen Pelvis With Contrast    Result Date: 4/6/2024  Narrative: CT ABDOMEN PELVIS W CONTRAST Date of Exam: 4/6/2024 5:32 PM EDT Indication: ruq abd pain. Comparison: None available. Technique: Axial CT images were obtained of the abdomen and pelvis following the uneventful intravenous administration of 90 cc Isovue-300 . Reconstructed coronal and sagittal images were also obtained. Automated exposure control and iterative construction methods were used. FINDINGS: Lung bases: No masses. No consolidation. Liver:No masses. No intrahepatic biliary ductal dilatation. Spleen:No masses. No perisplenic hematoma. Pancreas:No pancreatic masses. No evidence of pancreatitis. Gallbladder and common bile duct:No evidence of cholelithiasis. No evidence of cholecystitis. Adrenal glands:No adrenal masses Kidneys and ureters:No kidney stones. No renal masses.No calculi present within the ureters. Normal caliber ureters. Urinary bladder:No urinary bladder wall thickening. No bladder masses. Small bowel:Normal caliber small bowel. Large bowel: Extensive colonic diverticulosis without evidence of diverticulitis. Appendix: Not seen however there is no evidence of appendicitis GENITOURINARY: Right corpus luteum cyst. Ascites or pneumoperitoneum:None. Adenopathy:None present Osseous structures:Normal Other findings: Thrombosis within a focally dilated right gonadal vein. The  gonadal vein measures up to 1.4 cm.     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 No surrounding adenopathy. Normal appearance of the gallbladder. No rib fractures. Electronically Signed: Palmer Boo MD  4/6/2024 6:12 PM EDT  Workstation ID: [id number]     Assessment / Plan      Assessment/Plan:   1. Acute deep vein thrombosis (DVT) of non-extremity vein (Primary)  -Unclear etiology at this time with appears to be unprovoked  -Bilateral lower extremity duplex without evidence of DVT or SVT  -Hypercoagulable workup only notable for mildly elevated anticardiolipin IgM to 13 and slightly decreased protein S which can be present in somebody with an acute clot  -Will plan for at least 3 months of anticoagulation therapy finishing in July 2023.  Refill for apixaban placed today  -Will plan to recheck CT scan at that time  -Will plan to repeat anticardiolipin IgM and protein S levels at that visit     2.  Rash  -Resolved    3.  Ovarian cyst  -Incidentally noted on ultrasound but could have been related to her gonadal thrombus  -Follow-up with GYN as scheduled in May    Follow Up:   Follow-up in 2-3 months     Devyn Harvey MD  Hematology and Oncology     Please note that portions of this note may have been completed with a voice recognition program. Efforts were made to edit the dictations, but occasionally words are mistranscribed.

## 2024-05-15 ENCOUNTER — TELEPHONE (OUTPATIENT)
Dept: ONCOLOGY | Facility: CLINIC | Age: 44
End: 2024-05-15
Payer: COMMERCIAL

## 2024-05-15 ENCOUNTER — PATIENT MESSAGE (OUTPATIENT)
Dept: ONCOLOGY | Facility: CLINIC | Age: 44
End: 2024-05-15
Payer: COMMERCIAL

## 2024-05-15 DIAGNOSIS — I82.90 ACUTE DEEP VEIN THROMBOSIS (DVT) OF NON-EXTREMITY VEIN: Primary | ICD-10-CM

## 2024-05-15 NOTE — TELEPHONE ENCOUNTER
From: Radha Florence  To: Devyn Harvey  Sent: 5/15/2024 8:49 AM EDT  Subject: Spots     Good morning, I am not sure if it’s anything but this morning when I got to work I noticed on my neck I have spots like. My bottom leg area fills heavy, having a bad headache also. I just wanted to report this just in case it could be related to anything . Thank you

## 2024-05-15 NOTE — TELEPHONE ENCOUNTER
Call to Radha to ask where she would like to have ultrasound/duplex.  Radha states she would like to have done in [place].  Sindy will notify [place] and get placed on schedule.  Radha states understood

## 2024-05-18 ENCOUNTER — HOSPITAL ENCOUNTER (EMERGENCY)
Facility: HOSPITAL | Age: 44
Discharge: HOME | End: 2024-05-18
Attending: EMERGENCY MEDICINE
Payer: COMMERCIAL

## 2024-05-18 ENCOUNTER — APPOINTMENT (OUTPATIENT)
Dept: CARDIOLOGY | Facility: HOSPITAL | Age: 44
End: 2024-05-18
Payer: COMMERCIAL

## 2024-05-18 ENCOUNTER — APPOINTMENT (OUTPATIENT)
Dept: ULTRASOUND IMAGING | Facility: HOSPITAL | Age: 44
End: 2024-05-18
Payer: COMMERCIAL

## 2024-05-18 ENCOUNTER — APPOINTMENT (OUTPATIENT)
Dept: CT IMAGING | Facility: HOSPITAL | Age: 44
End: 2024-05-18
Payer: COMMERCIAL

## 2024-05-18 VITALS
WEIGHT: 119 LBS | OXYGEN SATURATION: 95 % | RESPIRATION RATE: 18 BRPM | HEART RATE: 75 BPM | HEIGHT: 69 IN | TEMPERATURE: 97.7 F | DIASTOLIC BLOOD PRESSURE: 95 MMHG | SYSTOLIC BLOOD PRESSURE: 112 MMHG | BODY MASS INDEX: 17.63 KG/M2

## 2024-05-18 DIAGNOSIS — R10.31 RIGHT LOWER QUADRANT ABDOMINAL PAIN: Primary | ICD-10-CM

## 2024-05-18 LAB
ALBUMIN SERPL-MCNC: 4.1 G/DL (ref 3.5–5.2)
ALBUMIN/GLOB SERPL: 1.5 G/DL
ALP SERPL-CCNC: 48 U/L (ref 39–117)
ALT SERPL W P-5'-P-CCNC: 11 U/L (ref 1–33)
ANION GAP SERPL CALCULATED.3IONS-SCNC: 10 MMOL/L (ref 5–15)
AST SERPL-CCNC: 15 U/L (ref 1–32)
BASOPHILS # BLD AUTO: 0.02 10*3/MM3 (ref 0–0.2)
BASOPHILS NFR BLD AUTO: 0.4 % (ref 0–1.5)
BILIRUB SERPL-MCNC: 0.7 MG/DL (ref 0–1.2)
BILIRUB UR QL STRIP: NEGATIVE
BUN SERPL-MCNC: 13 MG/DL (ref 6–20)
BUN/CREAT SERPL: 15.1 (ref 7–25)
CALCIUM SPEC-SCNC: 8.8 MG/DL (ref 8.6–10.5)
CHLORIDE SERPL-SCNC: 108 MMOL/L (ref 98–107)
CLARITY UR: CLEAR
CO2 SERPL-SCNC: 23 MMOL/L (ref 22–29)
COLOR UR: YELLOW
CREAT SERPL-MCNC: 0.86 MG/DL (ref 0.57–1)
DEPRECATED RDW RBC AUTO: 43.8 FL (ref 37–54)
EGFRCR SERPLBLD CKD-EPI 2021: 86.1 ML/MIN/1.73
EOSINOPHIL # BLD AUTO: 0.06 10*3/MM3 (ref 0–0.4)
EOSINOPHIL NFR BLD AUTO: 1.1 % (ref 0.3–6.2)
ERYTHROCYTE [DISTWIDTH] IN BLOOD BY AUTOMATED COUNT: 12.2 % (ref 12.3–15.4)
GLOBULIN UR ELPH-MCNC: 2.8 GM/DL
GLUCOSE SERPL-MCNC: 86 MG/DL (ref 65–99)
GLUCOSE UR STRIP-MCNC: NEGATIVE MG/DL
HCT VFR BLD AUTO: 43.5 % (ref 34–46.6)
HGB BLD-MCNC: 14 G/DL (ref 12–15.9)
HGB UR QL STRIP.AUTO: NEGATIVE
HOLD SPECIMEN: NORMAL
IMM GRANULOCYTES # BLD AUTO: 0.01 10*3/MM3 (ref 0–0.05)
IMM GRANULOCYTES NFR BLD AUTO: 0.2 % (ref 0–0.5)
KETONES UR QL STRIP: NEGATIVE
LEUKOCYTE ESTERASE UR QL STRIP.AUTO: NEGATIVE
LIPASE SERPL-CCNC: 22 U/L (ref 13–60)
LYMPHOCYTES # BLD AUTO: 1.54 10*3/MM3 (ref 0.7–3.1)
LYMPHOCYTES NFR BLD AUTO: 28.9 % (ref 19.6–45.3)
MCH RBC QN AUTO: 31.2 PG (ref 26.6–33)
MCHC RBC AUTO-ENTMCNC: 32.2 G/DL (ref 31.5–35.7)
MCV RBC AUTO: 96.9 FL (ref 79–97)
MONOCYTES # BLD AUTO: 0.39 10*3/MM3 (ref 0.1–0.9)
MONOCYTES NFR BLD AUTO: 7.3 % (ref 5–12)
NEUTROPHILS NFR BLD AUTO: 3.3 10*3/MM3 (ref 1.7–7)
NEUTROPHILS NFR BLD AUTO: 62.1 % (ref 42.7–76)
NITRITE UR QL STRIP: NEGATIVE
NRBC BLD AUTO-RTO: 0 /100 WBC (ref 0–0.2)
PH UR STRIP.AUTO: 6.5 [PH] (ref 5–8)
PLATELET # BLD AUTO: 142 10*3/MM3 (ref 140–450)
PMV BLD AUTO: 11.8 FL (ref 6–12)
POTASSIUM SERPL-SCNC: 3.9 MMOL/L (ref 3.5–5.2)
PROT SERPL-MCNC: 6.9 G/DL (ref 6–8.5)
PROT UR QL STRIP: NEGATIVE
RBC # BLD AUTO: 4.49 10*6/MM3 (ref 3.77–5.28)
SODIUM SERPL-SCNC: 141 MMOL/L (ref 136–145)
SP GR UR STRIP: 1.02 (ref 1–1.03)
UROBILINOGEN UR QL STRIP: NORMAL
WBC NRBC COR # BLD AUTO: 5.32 10*3/MM3 (ref 3.4–10.8)
WHOLE BLOOD HOLD COAG: NORMAL
WHOLE BLOOD HOLD SPECIMEN: NORMAL

## 2024-05-18 PROCEDURE — 25810000003 SODIUM CHLORIDE 0.9 % SOLUTION: Performed by: EMERGENCY MEDICINE

## 2024-05-18 PROCEDURE — 93970 EXTREMITY STUDY: CPT

## 2024-05-18 PROCEDURE — 83690 ASSAY OF LIPASE: CPT | Performed by: EMERGENCY MEDICINE

## 2024-05-18 PROCEDURE — 25510000001 IOPAMIDOL 61 % SOLUTION: Performed by: EMERGENCY MEDICINE

## 2024-05-18 PROCEDURE — 99285 EMERGENCY DEPT VISIT HI MDM: CPT

## 2024-05-18 PROCEDURE — 80053 COMPREHEN METABOLIC PANEL: CPT | Performed by: EMERGENCY MEDICINE

## 2024-05-18 PROCEDURE — 93970 EXTREMITY STUDY: CPT | Performed by: INTERNAL MEDICINE

## 2024-05-18 PROCEDURE — 76830 TRANSVAGINAL US NON-OB: CPT

## 2024-05-18 PROCEDURE — 96375 TX/PRO/DX INJ NEW DRUG ADDON: CPT

## 2024-05-18 PROCEDURE — 81003 URINALYSIS AUTO W/O SCOPE: CPT | Performed by: EMERGENCY MEDICINE

## 2024-05-18 PROCEDURE — 74177 CT ABD & PELVIS W/CONTRAST: CPT

## 2024-05-18 PROCEDURE — 25010000002 HYDROMORPHONE PER 4 MG: Performed by: EMERGENCY MEDICINE

## 2024-05-18 PROCEDURE — 96374 THER/PROPH/DIAG INJ IV PUSH: CPT

## 2024-05-18 PROCEDURE — 25010000002 ONDANSETRON PER 1 MG: Performed by: EMERGENCY MEDICINE

## 2024-05-18 PROCEDURE — 85025 COMPLETE CBC W/AUTO DIFF WBC: CPT | Performed by: EMERGENCY MEDICINE

## 2024-05-18 RX ORDER — SODIUM CHLORIDE 0.9 % (FLUSH) 0.9 %
10 SYRINGE (ML) INJECTION AS NEEDED
Status: DISCONTINUED | OUTPATIENT
Start: 2024-05-18 | End: 2024-05-18 | Stop reason: HOSPADM

## 2024-05-18 RX ORDER — ONDANSETRON 2 MG/ML
4 INJECTION INTRAMUSCULAR; INTRAVENOUS ONCE
Status: COMPLETED | OUTPATIENT
Start: 2024-05-18 | End: 2024-05-18

## 2024-05-18 RX ORDER — HYDROMORPHONE HYDROCHLORIDE 1 MG/ML
0.5 INJECTION, SOLUTION INTRAMUSCULAR; INTRAVENOUS; SUBCUTANEOUS
Status: DISCONTINUED | OUTPATIENT
Start: 2024-05-18 | End: 2024-05-18 | Stop reason: HOSPADM

## 2024-05-18 RX ADMIN — SODIUM CHLORIDE 1000 ML: 9 INJECTION, SOLUTION INTRAVENOUS at 15:03

## 2024-05-18 RX ADMIN — ONDANSETRON 4 MG: 2 INJECTION INTRAMUSCULAR; INTRAVENOUS at 15:04

## 2024-05-18 RX ADMIN — HYDROMORPHONE HYDROCHLORIDE 0.5 MG: 1 INJECTION, SOLUTION INTRAMUSCULAR; INTRAVENOUS; SUBCUTANEOUS at 15:05

## 2024-05-18 RX ADMIN — IOPAMIDOL 85 ML: 612 INJECTION, SOLUTION INTRAVENOUS at 16:50

## 2024-05-18 NOTE — ED PROVIDER NOTES
Subjective   History of Present Illness  Patient is a pleasant 43-year-old female who presents to the emergency department with a right groin and right lower quadrant abdominal pain.  Pain started at 4 AM today.  She states that the pain is active and present for the past several weeks is recently diagnosed with a venous thrombosis in the right groin.  She was started on Eliquis and states that the pain has been at its baseline since that time.  Given for him, pain acutely worse and has been severe since then.      Review of Systems    Past Medical History:   Diagnosis Date    Migraine     Pneumonia        Allergies   Allergen Reactions    Vancomycin Other (See Comments)     Vancomycin infusion reaction after completion of administration; tolerated w/ slowed infusion rate  Red Man syndrome - not an allergy        Past Surgical History:   Procedure Laterality Date    DISC REMOVAL      lumbar    HYSTERECTOMY         Family History   Problem Relation Age of Onset    Heart attack Mother     Heart disease Father     Lung disease Father     Diabetes Sister     Diabetes Brother        Social History     Socioeconomic History    Marital status:    Tobacco Use    Smoking status: Never    Smokeless tobacco: Never   Vaping Use    Vaping status: Never Used   Substance and Sexual Activity    Alcohol use: Never    Drug use: Never    Sexual activity: Defer           Objective   Physical Exam    Procedures           ED Course                                             Medical Decision Making  Amount and/or Complexity of Data Reviewed  Labs: ordered.    Risk  Prescription drug management.        Final diagnoses:   None       ED Disposition  ED Disposition       None            No follow-up provider specified.       Medication List      No changes were made to your prescriptions during this visit.               Breath sounds: Normal breath sounds.   Abdominal:      General: Bowel sounds are normal. There is no distension.      Palpations: Abdomen is soft. There is no mass.      Tenderness: There is abdominal tenderness in the right lower quadrant. There is no rebound.   Musculoskeletal:         General: Normal range of motion.      Cervical back: Normal range of motion and neck supple.   Skin:     General: Skin is warm and dry.   Neurological:      Mental Status: She is alert and oriented to person, place, and time.         Procedures           ED Course       Latest Reference Range & Units 05/18/24 14:20   Sodium 136 - 145 mmol/L 141   Potassium 3.5 - 5.2 mmol/L 3.9   Chloride 98 - 107 mmol/L 108 (H)   CO2 22.0 - 29.0 mmol/L 23.0   Anion Gap 5.0 - 15.0 mmol/L 10.0   BUN 6 - 20 mg/dL 13   Creatinine 0.57 - 1.00 mg/dL 0.86   BUN/Creatinine Ratio 7.0 - 25.0  15.1   eGFR >60.0 mL/min/1.73 86.1   Glucose 65 - 99 mg/dL 86   Calcium 8.6 - 10.5 mg/dL 8.8   Alkaline Phosphatase 39 - 117 U/L 48   Total Protein 6.0 - 8.5 g/dL 6.9   Albumin 3.5 - 5.2 g/dL 4.1   Globulin gm/dL 2.8   A/G Ratio g/dL 1.5   AST (SGOT) 1 - 32 U/L 15   ALT (SGPT) 1 - 33 U/L 11   Total Bilirubin 0.0 - 1.2 mg/dL 0.7   Lipase 13 - 60 U/L 22   WBC 3.40 - 10.80 10*3/mm3 5.32   RBC 3.77 - 5.28 10*6/mm3 4.49   Hemoglobin 12.0 - 15.9 g/dL 14.0   Hematocrit 34.0 - 46.6 % 43.5   Platelets 140 - 450 10*3/mm3 142   RDW 12.3 - 15.4 % 12.2 (L)   MCV 79.0 - 97.0 fL 96.9   MCH 26.6 - 33.0 pg 31.2   MCHC 31.5 - 35.7 g/dL 32.2   MPV 6.0 - 12.0 fL 11.8   RDW-SD 37.0 - 54.0 fl 43.8   Neutrophil Rel % 42.7 - 76.0 % 62.1   Lymphocyte Rel % 19.6 - 45.3 % 28.9   Monocyte Rel % 5.0 - 12.0 % 7.3   Eosinophil Rel % 0.3 - 6.2 % 1.1   Basophil Rel % 0.0 - 1.5 % 0.4   Immature Granulocyte Rel % 0.0 - 0.5 % 0.2   Neutrophils Absolute 1.70 - 7.00 10*3/mm3 3.30   Lymphocytes Absolute 0.70 - 3.10 10*3/mm3 1.54   Monocytes Absolute 0.10 - 0.90 10*3/mm3 0.39   Eosinophils Absolute 0.00 -  0.40 10*3/mm3 0.06   Basophils Absolute 0.00 - 0.20 10*3/mm3 0.02   Immature Grans, Absolute 0.00 - 0.05 10*3/mm3 0.01   nRBC 0.0 - 0.2 /100 WBC 0.0   (H): Data is abnormally high  (L): Data is abnormally low    US Non-ob Transvaginal   Final Result   Impression:   Only a single ovary was able to be visualized due to poor acoustic windows in the setting of overlying bowel/bowel gas. This ovary demonstrates no evidence of torsion and has an associated 4.7 cm simple-appearing cyst. Of note, on this ultrasound this    ovary is labeled as right-sided, however may indeed be the left ovary that is medialized when compared to same day CT.      Hysterectomy.      No significant pelvic free fluid.         Electronically Signed: Esteban Weems MD     5/18/2024 5:40 PM EDT     Workstation ID: [id number]      CT Abdomen Pelvis With Contrast   Final Result   Impression:      Favored decreased size/burden of right ovarian vein thrombus, though evaluation is suboptimal due to contrast bolus timing.      No findings of appendicitis.      New 5 cm left adnexal cyst.         Electronically Signed: Esteban Weems MD     5/18/2024 5:12 PM EDT     Workstation ID: [id number]        Vitals:    05/18/24 1429 05/18/24 1601 05/18/24 1628 05/18/24 1814   BP: 106/69 110/80 113/73 112/95   BP Location:       Patient Position:       Pulse: 58 65 62 75   Resp:       Temp:       TempSrc:       SpO2: 98% 98% 99% 95%   Weight:       Height:         Medications   sodium chloride 0.9 % bolus 1,000 mL (0 mL Intravenous Stopped 5/18/24 1816)   ondansetron (ZOFRAN) injection 4 mg (4 mg Intravenous Given 5/18/24 1504)   iopamidol (ISOVUE-300) 61 % injection 100 mL (85 mL Intravenous Given 5/18/24 1650)     ECG/EMG Results (last 24 hours)       ** No results found for the last 24 hours. **          No orders to display                                              Medical Decision Making  Problems Addressed:  Right lower quadrant abdominal pain: complicated acute  illness or injury    Amount and/or Complexity of Data Reviewed  Independent Historian: EMS  External Data Reviewed: notes.  Labs: ordered. Decision-making details documented in ED Course.  Radiology: ordered and independent interpretation performed. Decision-making details documented in ED Course.    Risk  Prescription drug management.        Final diagnoses:   Right lower quadrant abdominal pain       ED Disposition  ED Disposition       ED Disposition   Discharge    Condition   Stable    Comment   --             DISCHARGE    Patient discharged in stable condition.    Reviewed implications of results, diagnosis, meds, responsibility to follow up, warning signs and symptoms of possible worsening, potential complications and reasons to return to ER.    Patient/Family voiced understanding of above instructions.    Discussed plan for discharge, as there is no emergent indication for admission.  Pt/family is agreeable and understands need for follow up and possible repeat testing.  Pt/family is aware that discharge does not mean that nothing is wrong but that it indicates no emergency is currently present that requires admission and they must continue care with follow-up as given below or with a physician of their choice.     FOLLOW-UP  Chantel Baker MD  [address] [place] [address]  [phone]    Schedule an appointment as soon as possible for a visit       [facility] EMERGENCY DEPARTMENT  [address] [facility] [address] [phone]    If symptoms worsen    Jeannine Finley MD  [address] [facility] [address]  [place] [id number] [phone]    Schedule an appointment as soon as possible for a visit            Medication List      No changes were made to your prescriptions during this visit.            Vahid Otero DO  05/21/24 [id number]

## 2024-05-19 LAB
BH CV LOWER VASCULAR LEFT COMMON FEMORAL AUGMENT: NORMAL
BH CV LOWER VASCULAR LEFT COMMON FEMORAL COMPRESS: NORMAL
BH CV LOWER VASCULAR LEFT COMMON FEMORAL PHASIC: NORMAL
BH CV LOWER VASCULAR LEFT COMMON FEMORAL SPONT: NORMAL
BH CV LOWER VASCULAR LEFT DISTAL FEMORAL COMPRESS: NORMAL
BH CV LOWER VASCULAR LEFT GASTRONEMIUS COMPRESS: NORMAL
BH CV LOWER VASCULAR LEFT GREATER SAPH AK COMPRESS: NORMAL
BH CV LOWER VASCULAR LEFT GREATER SAPH BK COMPRESS: NORMAL
BH CV LOWER VASCULAR LEFT LESSER SAPH COMPRESS: NORMAL
BH CV LOWER VASCULAR LEFT MID FEMORAL AUGMENT: NORMAL
BH CV LOWER VASCULAR LEFT MID FEMORAL COMPRESS: NORMAL
BH CV LOWER VASCULAR LEFT MID FEMORAL PHASIC: NORMAL
BH CV LOWER VASCULAR LEFT MID FEMORAL SPONT: NORMAL
BH CV LOWER VASCULAR LEFT PERONEAL COMPRESS: NORMAL
BH CV LOWER VASCULAR LEFT POPLITEAL AUGMENT: NORMAL
BH CV LOWER VASCULAR LEFT POPLITEAL COMPRESS: NORMAL
BH CV LOWER VASCULAR LEFT POPLITEAL PHASIC: NORMAL
BH CV LOWER VASCULAR LEFT POPLITEAL SPONT: NORMAL
BH CV LOWER VASCULAR LEFT POSTERIOR TIBIAL COMPRESS: NORMAL
BH CV LOWER VASCULAR LEFT PROFUNDA FEMORAL COMPRESS: NORMAL
BH CV LOWER VASCULAR LEFT PROXIMAL FEMORAL COMPRESS: NORMAL
BH CV LOWER VASCULAR LEFT SAPHENOFEMORAL JUNCTION COMPRESS: NORMAL
BH CV LOWER VASCULAR RIGHT COMMON FEMORAL AUGMENT: NORMAL
BH CV LOWER VASCULAR RIGHT COMMON FEMORAL COMPRESS: NORMAL
BH CV LOWER VASCULAR RIGHT COMMON FEMORAL PHASIC: NORMAL
BH CV LOWER VASCULAR RIGHT COMMON FEMORAL SPONT: NORMAL
BH CV LOWER VASCULAR RIGHT DISTAL FEMORAL COMPRESS: NORMAL
BH CV LOWER VASCULAR RIGHT EXTERNAL ILIAC AUGMENT: NORMAL
BH CV LOWER VASCULAR RIGHT EXTERNAL ILIAC COMPRESS: NORMAL
BH CV LOWER VASCULAR RIGHT EXTERNAL ILIAC PHASIC: NORMAL
BH CV LOWER VASCULAR RIGHT EXTERNAL ILIAC SPONT: NORMAL
BH CV LOWER VASCULAR RIGHT GASTRONEMIUS COMPRESS: NORMAL
BH CV LOWER VASCULAR RIGHT GREATER SAPH AK COMPRESS: NORMAL
BH CV LOWER VASCULAR RIGHT GREATER SAPH BK COMPRESS: NORMAL
BH CV LOWER VASCULAR RIGHT LESSER SAPH COMPRESS: NORMAL
BH CV LOWER VASCULAR RIGHT MID FEMORAL AUGMENT: NORMAL
BH CV LOWER VASCULAR RIGHT MID FEMORAL COMPETENT: NORMAL
BH CV LOWER VASCULAR RIGHT MID FEMORAL COMPRESS: NORMAL
BH CV LOWER VASCULAR RIGHT MID FEMORAL PHASIC: NORMAL
BH CV LOWER VASCULAR RIGHT MID FEMORAL SPONT: NORMAL
BH CV LOWER VASCULAR RIGHT PERONEAL COMPRESS: NORMAL
BH CV LOWER VASCULAR RIGHT POPLITEAL AUGMENT: NORMAL
BH CV LOWER VASCULAR RIGHT POPLITEAL COMPRESS: NORMAL
BH CV LOWER VASCULAR RIGHT POPLITEAL PHASIC: NORMAL
BH CV LOWER VASCULAR RIGHT POPLITEAL SPONT: NORMAL
BH CV LOWER VASCULAR RIGHT POSTERIOR TIBIAL COMPRESS: NORMAL
BH CV LOWER VASCULAR RIGHT PROFUNDA FEMORAL COMPRESS: NORMAL
BH CV LOWER VASCULAR RIGHT PROXIMAL FEMORAL COMPRESS: NORMAL
BH CV LOWER VASCULAR RIGHT SAPHENOFEMORAL JUNCTION COMPRESS: NORMAL
BH CV VAS PRELIMINARY FINDINGS SCRIPTING: 1

## 2024-07-08 ENCOUNTER — OFFICE VISIT (OUTPATIENT)
Dept: ONCOLOGY | Facility: CLINIC | Age: 44
End: 2024-07-08
Payer: COMMERCIAL

## 2024-07-08 ENCOUNTER — LAB (OUTPATIENT)
Dept: LAB | Facility: HOSPITAL | Age: 44
End: 2024-07-08
Payer: COMMERCIAL

## 2024-07-08 ENCOUNTER — HOSPITAL ENCOUNTER (OUTPATIENT)
Dept: CT IMAGING | Facility: HOSPITAL | Age: 44
Discharge: HOME | End: 2024-07-08
Payer: COMMERCIAL

## 2024-07-08 VITALS
DIASTOLIC BLOOD PRESSURE: 64 MMHG | SYSTOLIC BLOOD PRESSURE: 107 MMHG | TEMPERATURE: 97.8 F | HEART RATE: 82 BPM | WEIGHT: 111.4 LBS | OXYGEN SATURATION: 99 % | HEIGHT: 69 IN | BODY MASS INDEX: 16.5 KG/M2

## 2024-07-08 DIAGNOSIS — I82.90 ACUTE DEEP VEIN THROMBOSIS (DVT) OF NON-EXTREMITY VEIN: ICD-10-CM

## 2024-07-08 DIAGNOSIS — I82.90 ACUTE DEEP VEIN THROMBOSIS (DVT) OF NON-EXTREMITY VEIN: Primary | ICD-10-CM

## 2024-07-08 PROCEDURE — 85306 CLOT INHIBIT PROT S FREE: CPT

## 2024-07-08 PROCEDURE — 86147 CARDIOLIPIN ANTIBODY EA IG: CPT

## 2024-07-08 PROCEDURE — 74177 CT ABD & PELVIS W/CONTRAST: CPT

## 2024-07-08 PROCEDURE — 99214 OFFICE O/P EST MOD 30 MIN: CPT | Performed by: INTERNAL MEDICINE

## 2024-07-08 PROCEDURE — 25510000001 IOPAMIDOL 61 % SOLUTION: Performed by: INTERNAL MEDICINE

## 2024-07-08 PROCEDURE — 36415 COLL VENOUS BLD VENIPUNCTURE: CPT

## 2024-07-08 PROCEDURE — 85305 CLOT INHIBIT PROT S TOTAL: CPT

## 2024-07-08 PROCEDURE — 85302 CLOT INHIBIT PROT C ANTIGEN: CPT

## 2024-07-08 RX ADMIN — IOPAMIDOL 100 ML: 612 INJECTION, SOLUTION INTRAVENOUS at 11:40

## 2024-07-08 NOTE — PROGRESS NOTES
Follow Up Office Visit      Date: 2024     Patient Name: Radha Florence  MRN: [id number]  : 1980  Referring Physician: Hospital follow-up     Chief Complaint: Follow-up for right gonadal vein thrombosis     History of Present Illness: Ms Florence is a 43-year-old lady with past medical history of migraines who presented [facility] with abdominal pain and back pain.  Notes that she has been having some fatigue and nausea over the past several weeks.  Developed some right-sided abdominal pain as well as a rash in her right axilla and inguinal region.  She underwent a CT scan on presentation which noted right gonadal vein thrombus.  She does note some leg pain and discomfort but states that this is improved with steroids in the past.  Denies any swelling, redness today.  Does note a family history of blood clots in her father.  She denies any long car rides or plane rides.  Denies any recent COVID infection or vaccination.      Interval History:  Presents to clinic for follow-up.  Remains on apixaban.  Recently underwent a laparoscopic oophorectomy in 2024.  Pathology only notable for cyst.  Still having some slight discomfort related to this but otherwise no major concerns or complaints.  Denies any chest pain or shortness of breath    Oncology History:    Oncology/Hematology History    No history exists.       Subjective      Review of Systems:   Constitutional: Negative for fevers, chills, or weight loss  Eyes: Negative for blurred vision or discharge         Ear/Nose/Throat: Negative for difficulty swallowing, sore throat, LAD                                                       Respiratory: Negative for cough, SOA, wheezing                                                                                        Cardiovascular: Negative for chest pain or palpitations                                                                  Gastrointestinal: Negative for nausea, vomiting  or diarrhea                                                                     Genitourinary: Negative for dysuria or hematuria                                                                                           Musculoskeletal: Negative for any joint pains or muscle aches                                                                        Neurologic: Negative for any weakness, headaches, dizziness                                                                         Hematologic: Negative for any easy bleeding or bruising                                                                                   Psychiatric: Negative for anxiety or depression                          Past Medical History/Past Surgical History/ Family History/ Social History: Reviewed by me and unchanged from my previous documentation done on April 2024.     Medications:     Current Outpatient Medications:     albuterol sulfate  (90 Base) MCG/ACT inhaler, As Needed., Disp: , Rfl:     apixaban (ELIQUIS) 5 MG tablet tablet, Take 1 tablet by mouth 2 (Two) Times a Day., Disp: 60 tablet, Rfl: 2    HYDROcodone-acetaminophen (NORCO)  MG per tablet, Take 1 tablet by mouth Every 6 (Six) Hours As Needed for Moderate Pain., Disp: , Rfl:     lidocaine (LIDODERM) 5 %, Apply 1 patch topically to the appropriate area as directed As Needed., Disp: , Rfl:     PHARMACY MEDS TO BED CONSULT, Use Daily., Disp: , Rfl:     promethazine (PHENERGAN) 25 MG tablet, As Needed., Disp: , Rfl:     tiZANidine (ZANAFLEX) 4 MG tablet, Take 1 tablet by mouth At Night As Needed for Muscle Spasms., Disp: , Rfl:     topiramate (TOPAMAX) 100 MG tablet, Take 1 tablet by mouth Daily., Disp: , Rfl:     triamcinolone (KENALOG) 0.1 % cream, Apply 1 Application topically to the appropriate area as directed 2 (Two) Times a Day., Disp: , Rfl:     DULoxetine (CYMBALTA) 60 MG capsule, Take 1 capsule by mouth Daily., Disp: , Rfl:     gabapentin (NEURONTIN) 800 MG  "tablet, Take 1 tablet by mouth Daily., Disp: , Rfl:     SUMAtriptan (IMITREX) 50 MG tablet, Take 1 tablet by mouth As Needed., Disp: , Rfl:   No current facility-administered medications for this visit.    Allergies:   Allergies   Allergen Reactions    Vancomycin Other (See Comments)     Vancomycin infusion reaction after completion of administration; tolerated w/ slowed infusion rate  Red Man syndrome - not an allergy        Objective     Physical Exam:  Vital Signs:   Vitals:    07/08/24 1448   BP: 107/64   Pulse: 82   Temp: 97.8 °F (36.6 °C)   SpO2: 99%   Weight: 50.5 kg (111 lb 6.4 oz)   Height: 175.3 cm (69\")     There were no vitals filed for this visit.  ECOG Performance Status: 0 - Asymptomatic    Constitutional: NAD, ECOG 0  Eyes: PERRLA, scleral anicteric  ENT: No LAD, no thyromegaly  Respiratory: CTAB, no wheezing, rales, rhonchi  Cardiovascular: RRR, no murmurs, pulses 2+ bilaterally  Abdomen: soft, NT/ND, no HSM  Musculoskeletal: strength 5/5 bilaterally, no c/c/e  Neurologic: A&O x 3, CN II-XII intact grossly    Results Review:   No visits with results within 2 Week(s) from this visit.   Latest known visit with results is:   Admission on 05/18/2024, Discharged on 05/18/2024   Component Date Value Ref Range Status    Extra Tube 05/18/2024 Hold for add-ons.   Final    Auto resulted.    Extra Tube 05/18/2024 hold for add-on   Final    Auto resulted    Extra Tube 05/18/2024 Hold for add-ons.   Final    Auto resulted.    Extra Tube 05/18/2024 Hold for add-ons.   Final    Auto resulted.    Extra Tube 05/18/2024 Hold for add-ons.   Final    Auto resulted    Glucose 05/18/2024 86  65 - 99 mg/dL Final    BUN 05/18/2024 13  6 - 20 mg/dL Final    Creatinine 05/18/2024 0.86  0.57 - 1.00 mg/dL Final    Sodium 05/18/2024 141  136 - 145 mmol/L Final    Potassium 05/18/2024 3.9  3.5 - 5.2 mmol/L Final    Slight hemolysis detected by analyzer. Result may be falsely elevated.    Chloride 05/18/2024 108 (H) 27 - 107 " mmol/L Final    CO2 05/18/2024 23.0  22.0 - 29.0 mmol/L Final    Calcium 05/18/2024 8.8  8.6 - 10.5 mg/dL Final    Total Protein 05/18/2024 6.9  6.0 - 8.5 g/dL Final    Albumin 05/18/2024 4.1  3.5 - 5.2 g/dL Final    ALT (SGPT) 05/18/2024 11  1 - 33 U/L Final    AST (SGOT) 05/18/2024 15  1 - 32 U/L Final    Alkaline Phosphatase 05/18/2024 48  39 - 117 U/L Final    Total Bilirubin 05/18/2024 0.7  0.0 - 1.2 mg/dL Final    Globulin 05/18/2024 2.8  gm/dL Final    Calculated Result    A/G Ratio 05/18/2024 1.5  g/dL Final    BUN/Creatinine Ratio 05/18/2024 15.1  7.0 - 25.0 Final    Anion Gap 05/18/2024 10.0  5.0 - 15.0 mmol/L Final    eGFR 05/18/2024 86.1  >60.0 mL/min/1.73 Final    Lipase 05/18/2024 22  13 - 60 U/L Final    Color, UA 05/18/2024 Yellow  Yellow, Straw Final    Appearance, UA 05/18/2024 Clear  Clear Final    pH, UA 05/18/2024 6.5  5.0 - 8.0 Final    Specific Gravity, UA 05/18/2024 1.019  1.001 - 1.030 Final    Glucose, UA 05/18/2024 Negative  Negative Final    Ketones, UA 05/18/2024 Negative  Negative Final    Bilirubin, UA 05/18/2024 Negative  Negative Final    Blood, UA 05/18/2024 Negative  Negative Final    Protein, UA 05/18/2024 Negative  Negative Final    Leuk Esterase, UA 05/18/2024 Negative  Negative Final    Nitrite, UA 05/18/2024 Negative  Negative Final    Urobilinogen, UA 05/18/2024 1.0 E.U./dL  0.2 - 1.0 E.U./dL Final    WBC 05/18/2024 5.32  3.40 - 10.80 10*3/mm3 Final    RBC 05/18/2024 4.49  3.77 - 5.28 10*6/mm3 Final    Hemoglobin 05/18/2024 14.0  12.0 - 15.9 g/dL Final    Hematocrit 05/18/2024 43.5  34.0 - 46.6 % Final    MCV 05/18/2024 96.9  79.0 - 97.0 fL Final    MCH 05/18/2024 31.2  26.6 - 33.0 pg Final    MCHC 05/18/2024 32.2  31.5 - 35.7 g/dL Final    RDW 05/18/2024 12.2 (L)  12.3 - 15.4 % Final    RDW-SD 05/18/2024 43.8  37.0 - 54.0 fl Final    MPV 05/18/2024 11.8  6.0 - 12.0 fL Final    Platelets 05/18/2024 142  140 - 450 10*3/mm3 Final    Neutrophil % 05/18/2024 62.1  42.7 - 76.0 %  Final    Lymphocyte % 05/18/2024 28.9  19.6 - 45.3 % Final    Monocyte % 05/18/2024 7.3  5.0 - 12.0 % Final    Eosinophil % 05/18/2024 1.1  0.3 - 6.2 % Final    Basophil % 05/18/2024 0.4  0.0 - 1.5 % Final    Immature Grans % 05/18/2024 0.2  0.0 - 0.5 % Final    Neutrophils, Absolute 05/18/2024 3.30  1.70 - 7.00 10*3/mm3 Final    Lymphocytes, Absolute 05/18/2024 1.54  0.70 - 3.10 10*3/mm3 Final    Monocytes, Absolute 05/18/2024 0.39  0.10 - 0.90 10*3/mm3 Final    Eosinophils, Absolute 05/18/2024 0.06  0.00 - 0.40 10*3/mm3 Final    Basophils, Absolute 05/18/2024 0.02  0.00 - 0.20 10*3/mm3 Final    Immature Grans, Absolute 05/18/2024 0.01  0.00 - 0.05 10*3/mm3 Final    nRBC 05/18/2024 0.0  0.0 - 0.2 /100 WBC Final    Right External Iliac Spont 05/18/2024 Y   Final    Right External Iliac Phasic 05/18/2024 Y   Final    Right External Iliac Compress 05/18/2024 C   Final    Right External Iliac Augment 05/18/2024 Y   Final    Right Common Femoral Spont 05/18/2024 Y   Final    Right Common Femoral Phasic 05/18/2024 Y   Final    Right Common Femoral Compress 05/18/2024 C   Final    Right Common Femoral Augment 05/18/2024 Y   Final    Right Saphenofemoral Junction Comp* 05/18/2024 C   Final    Right Profunda Femoral Compress 05/18/2024 C   Final    Right Proximal Femoral Compress 05/18/2024 C   Final    Right Mid Femoral Spont 05/18/2024 Y   Final    Right Mid Femoral Competent 05/18/2024 Y   Final    Right Mid Femoral Phasic 05/18/2024 Y   Final    Right Mid Femoral Compress 05/18/2024 C   Final    Right Mid Femoral Augment 05/18/2024 Y   Final    Right Distal Femoral Compress 05/18/2024 C   Final    Right Popliteal Spont 05/18/2024 Y   Final    Right Popliteal Phasic 05/18/2024 Y   Final    Right Popliteal Compress 05/18/2024 C   Final    Right Popliteal Augment 05/18/2024 Y   Final    Right Posterior Tibial Compress 05/18/2024 C   Final    Right Peroneal Compress 05/18/2024 C   Final    Right Gastronemius Compress  05/18/2024 C   Final    Right Greater Saph AK Compress 05/18/2024 C   Final    Right Greater Saph BK Compress 05/18/2024 C   Final    Right Lesser Saph Compress 05/18/2024 C   Final    Left Common Femoral Spont 05/18/2024 Y   Final    Left Common Femoral Phasic 05/18/2024 Y   Final    Left Common Femoral Compress 05/18/2024 C   Final    Left Common Femoral Augment 05/18/2024 Y   Final    Left Saphenofemoral Junction Compr* 05/18/2024 C   Final    Left Profunda Femoral Compress 05/18/2024 C   Final    Left Proximal Femoral Compress 05/18/2024 C   Final    Left Mid Femoral Spont 05/18/2024 Y   Final    Left Mid Femoral Phasic 05/18/2024 Y   Final    Left Mid Femoral Compress 05/18/2024 C   Final    Left Mid Femoral Augment 05/18/2024 Y   Final    Left Distal Femoral Compress 05/18/2024 C   Final    Left Popliteal Spont 05/18/2024 Y   Final    Left Popliteal Phasic 05/18/2024 Y   Final    Left Popliteal Compress 05/18/2024 C   Final    Left Popliteal Augment 05/18/2024 Y   Final    Left Posterior Tibial Compress 05/18/2024 C   Final    Left Peroneal Compress 05/18/2024 C   Final    Left Gastronemius Compress 05/18/2024 C   Final    Left Greater Saph AK Compress 05/18/2024 C   Final    Left Greater Saph BK Compress 05/18/2024 C   Final    Left Lesser Saph Compress 05/18/2024 C   Final    BH CV VAS PRELIMINARY FINDINGS SCR* 05/18/2024 1.0   Final       CT Abdomen Pelvis With Contrast    Result Date: 7/8/2024  Narrative: CT ABDOMEN PELVIS W CONTRAST Date of Exam: 7/8/2024 11:15 AM EDT Indication: check for gonadal vein thrombosis resolution. Comparison: May 18, 2024 Technique: Axial CT images were obtained of the abdomen and pelvis following the uneventful intravenous administration of 100 mL Isovue 300. Reconstructed coronal and sagittal images were also obtained. Automated exposure control and iterative construction methods were used. Findings: The lung bases are clear. There is a small amount of residual thrombus  within the right gonadal vein that appears slightly improved to prior CT, and is best visualized on image 61 of series 3. The liver, gallbladder, adrenal glands, kidneys, spleen, and pancreas are unremarkable. The stomach appears normal. The small bowel appears normal in caliber and configuration. The colon appears normal. The appendix appears normal. There is no ascites or loculated collection. No abnormally enlarged lymph nodes are identified. The rectum and urinary bladder are unremarkable. The uterus is surgically absent. No aggressive osseous lesions are identified.     Impression: Impression: 1.Small amount of residual thrombus with right gonadal vein, which appears slightly improved from prior CT. 2.No new acute process identified within abdomen/pelvis. Electronically Signed: Steven Steve MD  7/8/2024 11:51 AM EDT  Workstation ID: [id number]     Assessment / Plan      Assessment/Plan:   1. Acute deep vein thrombosis (DVT) of non-extremity vein (Primary)  -Unclear etiology at this time with appears to be unprovoked  -Bilateral lower extremity duplex without evidence of DVT or SVT  -Hypercoagulable workup only notable for mildly elevated anticardiolipin IgM to 13 and slightly decreased protein S which can be present in somebody with an acute clot  -CT abdomen/pelvis in July 2024 showing a small amount of residual thrombus within the right gonadal vein  -Repeat anticardiolipin IgM and protein S levels pending  -Will plan to continue Eliquis at this time and we will plan to repeat CT in 3 months     2.  Rash  -Resolved     3.  Ovarian cyst  -Status post bilateral oophorectomy in June 2024  -Pathology without evidence of malignancy         Follow Up:   Follow-up in 3 months     Devyn Harvey MD  Hematology and Oncology     Please note that portions of this note may have been completed with a voice recognition program. Efforts were made to edit the dictations, but occasionally words are mistranscribed.

## 2024-07-10 LAB
CARDIOLIPIN IGG SER IA-ACNC: <9 GPL U/ML (ref 0–14)
CARDIOLIPIN IGM SER IA-ACNC: 10 MPL U/ML (ref 0–12)

## 2024-07-11 LAB
PROT C AG ACT/NOR PPP IA: 96 % (ref 60–150)
PROT S ACT/NOR PPP: 82 % (ref 63–140)
PROT S AG ACT/NOR PPP IA: 81 % (ref 60–150)
PROT S FREE AG ACT/NOR PPP IA: 102 % (ref 61–136)

## 2024-07-23 ENCOUNTER — PATIENT MESSAGE (OUTPATIENT)
Dept: ONCOLOGY | Facility: CLINIC | Age: 44
End: 2024-07-23
Payer: COMMERCIAL

## 2024-07-23 DIAGNOSIS — I82.90 ACUTE DEEP VEIN THROMBOSIS (DVT) OF NON-EXTREMITY VEIN: Primary | ICD-10-CM

## 2024-07-23 NOTE — TELEPHONE ENCOUNTER
Contacted patient via telephone. She stated her legs began hurting yesterday, mainly her right leg.  She denied any redness, edema or warmth.  She stated Tylenol and ibuprofen are not helping.  She stated the pain is mainly at the top of the posterior aspect of her right leg.  Patient stated she has been intermittently getting dizzy.  Dr. Harvey notified and order for RLE US received and placed.  Advised patient scheduling will contact her tomorrow with an appointment.  Advised patient not to rub that area in the meantime.  Patient verbalized understanding and agreed.  Message sent to scheduling.

## 2024-07-24 ENCOUNTER — HOSPITAL ENCOUNTER (OUTPATIENT)
Dept: CARDIOLOGY | Facility: HOSPITAL | Age: 44
Discharge: HOME | End: 2024-07-24
Admitting: INTERNAL MEDICINE
Payer: COMMERCIAL

## 2024-07-24 DIAGNOSIS — I82.90 ACUTE DEEP VEIN THROMBOSIS (DVT) OF NON-EXTREMITY VEIN: ICD-10-CM

## 2024-07-24 LAB
BH CV LOWER VASCULAR LEFT COMMON FEMORAL AUGMENT: NORMAL
BH CV LOWER VASCULAR LEFT COMMON FEMORAL PHASIC: NORMAL
BH CV LOWER VASCULAR LEFT COMMON FEMORAL SPONT: NORMAL
BH CV LOWER VASCULAR RIGHT ANTERIOR SAPH COMPRESS: NORMAL
BH CV LOWER VASCULAR RIGHT COMMON FEMORAL AUGMENT: NORMAL
BH CV LOWER VASCULAR RIGHT COMMON FEMORAL COMPRESS: NORMAL
BH CV LOWER VASCULAR RIGHT COMMON FEMORAL PHASIC: NORMAL
BH CV LOWER VASCULAR RIGHT COMMON FEMORAL SPONT: NORMAL
BH CV LOWER VASCULAR RIGHT DISTAL FEMORAL COMPRESS: NORMAL
BH CV LOWER VASCULAR RIGHT EXTERNAL ILIAC AUGMENT: NORMAL
BH CV LOWER VASCULAR RIGHT EXTERNAL ILIAC COMPRESS: NORMAL
BH CV LOWER VASCULAR RIGHT EXTERNAL ILIAC PHASIC: NORMAL
BH CV LOWER VASCULAR RIGHT EXTERNAL ILIAC SPONT: NORMAL
BH CV LOWER VASCULAR RIGHT GASTRONEMIUS COMPRESS: NORMAL
BH CV LOWER VASCULAR RIGHT GREATER SAPH AK COMPRESS: NORMAL
BH CV LOWER VASCULAR RIGHT GREATER SAPH BK COMPRESS: NORMAL
BH CV LOWER VASCULAR RIGHT LESSER SAPH COMPRESS: NORMAL
BH CV LOWER VASCULAR RIGHT MID FEMORAL AUGMENT: NORMAL
BH CV LOWER VASCULAR RIGHT MID FEMORAL COMPRESS: NORMAL
BH CV LOWER VASCULAR RIGHT MID FEMORAL PHASIC: NORMAL
BH CV LOWER VASCULAR RIGHT MID FEMORAL SPONT: NORMAL
BH CV LOWER VASCULAR RIGHT PERONEAL COMPRESS: NORMAL
BH CV LOWER VASCULAR RIGHT POPLITEAL AUGMENT: NORMAL
BH CV LOWER VASCULAR RIGHT POPLITEAL COMPRESS: NORMAL
BH CV LOWER VASCULAR RIGHT POPLITEAL PHASIC: NORMAL
BH CV LOWER VASCULAR RIGHT POPLITEAL SPONT: NORMAL
BH CV LOWER VASCULAR RIGHT POSTERIOR TIBIAL COMPRESS: NORMAL
BH CV LOWER VASCULAR RIGHT PROXIMAL FEMORAL COMPRESS: NORMAL
BH CV LOWER VASCULAR RIGHT SAPHENOFEMORAL JUNCTION COMPRESS: NORMAL

## 2024-07-24 PROCEDURE — 93971 EXTREMITY STUDY: CPT

## 2024-10-07 ENCOUNTER — HOSPITAL ENCOUNTER (OUTPATIENT)
Dept: CT IMAGING | Facility: HOSPITAL | Age: 44
Discharge: HOME | End: 2024-10-07
Admitting: INTERNAL MEDICINE
Payer: COMMERCIAL

## 2024-10-07 ENCOUNTER — OFFICE VISIT (OUTPATIENT)
Dept: ONCOLOGY | Facility: CLINIC | Age: 44
End: 2024-10-07
Payer: COMMERCIAL

## 2024-10-07 VITALS
HEART RATE: 56 BPM | DIASTOLIC BLOOD PRESSURE: 79 MMHG | BODY MASS INDEX: 17.03 KG/M2 | OXYGEN SATURATION: 100 % | RESPIRATION RATE: 16 BRPM | SYSTOLIC BLOOD PRESSURE: 135 MMHG | TEMPERATURE: 97.3 F | HEIGHT: 69 IN | WEIGHT: 115 LBS

## 2024-10-07 DIAGNOSIS — I82.90 ACUTE DEEP VEIN THROMBOSIS (DVT) OF NON-EXTREMITY VEIN: ICD-10-CM

## 2024-10-07 DIAGNOSIS — I82.90 ACUTE DEEP VEIN THROMBOSIS (DVT) OF NON-EXTREMITY VEIN: Primary | ICD-10-CM

## 2024-10-07 PROCEDURE — 99214 OFFICE O/P EST MOD 30 MIN: CPT | Performed by: INTERNAL MEDICINE

## 2024-10-07 PROCEDURE — 74177 CT ABD & PELVIS W/CONTRAST: CPT

## 2024-10-07 PROCEDURE — 25510000001 IOPAMIDOL PER 1 ML: Performed by: INTERNAL MEDICINE

## 2024-10-07 RX ORDER — IOPAMIDOL 755 MG/ML
100 INJECTION, SOLUTION INTRAVASCULAR
Status: COMPLETED | OUTPATIENT
Start: 2024-10-07 | End: 2024-10-07

## 2024-10-07 RX ORDER — UBROGEPANT 50 MG/1
50-100 TABLET ORAL
COMMUNITY
Start: 2024-09-16 | End: 2024-12-15

## 2024-10-07 RX ORDER — AMOXICILLIN AND CLAVULANATE POTASSIUM 500; 125 MG/1; MG/1
1 TABLET, FILM COATED ORAL 2 TIMES DAILY
Qty: 14 TABLET | Refills: 0 | Status: SHIPPED | OUTPATIENT
Start: 2024-10-07

## 2024-10-07 RX ADMIN — IOPAMIDOL 90 ML: 755 INJECTION, SOLUTION INTRAVENOUS at 10:00

## 2024-10-07 NOTE — PROGRESS NOTES
Follow Up Office Visit      Date: 10/07/2024     Patient Name: Radha Florence  MRN: [id number]  : 1980  Referring Physician: Hospital follow-up     Chief Complaint: Follow-up for right gonadal vein thrombosis     History of Present Illness: Ms Florence is a 43-year-old lady with past medical history of migraines who presented [facility] with abdominal pain and back pain.  Notes that she has been having some fatigue and nausea over the past several weeks.  Developed some right-sided abdominal pain as well as a rash in her right axilla and inguinal region.  She underwent a CT scan on presentation which noted right gonadal vein thrombus.  She does note some leg pain and discomfort but states that this is improved with steroids in the past.  Denies any swelling, redness today.  Does note a family history of blood clots in her father.  She denies any long car rides or plane rides.  Denies any recent COVID infection or vaccination.      Interval History:  Presents to clinic for follow-up.  Remains on apixaban.  Denies any abdominal pain or discomfort.  Denies any lower extremity pain or discomfort.    Oncology History:    Oncology/Hematology History    No history exists.       Subjective      Review of Systems:   Constitutional: Negative for fevers, chills, or weight loss  Eyes: Negative for blurred vision or discharge         Ear/Nose/Throat: Negative for difficulty swallowing, sore throat, LAD                                                       Respiratory: Negative for cough, SOA, wheezing                                                                                        Cardiovascular: Negative for chest pain or palpitations                                                                  Gastrointestinal: Negative for nausea, vomiting or diarrhea                                                                     Genitourinary: Negative for dysuria or hematuria                                                                                            Musculoskeletal: Negative for any joint pains or muscle aches                                                                        Neurologic: Negative for any weakness, headaches, dizziness                                                                         Hematologic: Negative for any easy bleeding or bruising                                                                                   Psychiatric: Negative for anxiety or depression                          Past Medical History/Past Surgical History/ Family History/ Social History: Reviewed by me and unchanged from my previous documentation done on July 2024.     Medications:     Current Outpatient Medications:     albuterol sulfate  (90 Base) MCG/ACT inhaler, As Needed., Disp: , Rfl:     DULoxetine (CYMBALTA) 60 MG capsule, Take 1 capsule by mouth Daily., Disp: , Rfl:     gabapentin (NEURONTIN) 800 MG tablet, Take 1 tablet by mouth Daily., Disp: , Rfl:     HYDROcodone-acetaminophen (NORCO)  MG per tablet, Take 1 tablet by mouth Every 6 (Six) Hours As Needed for Moderate Pain., Disp: , Rfl:     PHARMACY MEDS TO BED CONSULT, Use Daily., Disp: , Rfl:     promethazine (PHENERGAN) 25 MG tablet, As Needed., Disp: , Rfl:     tiZANidine (ZANAFLEX) 4 MG tablet, Take 1 tablet by mouth At Night As Needed for Muscle Spasms., Disp: , Rfl:     topiramate (TOPAMAX) 100 MG tablet, Take 1 tablet by mouth Daily., Disp: , Rfl:     triamcinolone (KENALOG) 0.1 % cream, Apply 1 Application topically to the appropriate area as directed 2 (Two) Times a Day., Disp: , Rfl:     Ubrelvy 50 MG tablet, Take 1-2 tablets by mouth., Disp: , Rfl:     amoxicillin-clavulanate (AUGMENTIN) 500-125 MG per tablet, Take 1 tablet by mouth 2 (Two) Times a Day., Disp: 14 tablet, Rfl: 0  No current facility-administered medications for this visit.    Allergies:   Allergies   Allergen Reactions    Vancomycin Other (See  "Comments)     Vancomycin infusion reaction after completion of administration; tolerated w/ slowed infusion rate  Red Man syndrome - not an allergy        Objective     Physical Exam:  Vital Signs:   Vitals:    10/07/24 1015   BP: 135/79  Comment: PEPPERE   Pulse: 56   Resp: 16   Temp: 97.3 °F (36.3 °C)   TempSrc: Infrared   SpO2: 100%  Comment: RA   Weight: 52.2 kg (115 lb)   Height: 175.3 cm (69\")   PainSc: 0-No pain     Pain Score    10/07/24 1015   PainSc: 0-No pain     ECOG Performance Status: 0 - Asymptomatic    Constitutional: NAD, ECOG 0  Eyes: PERRLA, scleral anicteric  ENT: No LAD, no thyromegaly  Respiratory: CTAB, no wheezing, rales, rhonchi  Cardiovascular: RRR, no murmurs, pulses 2+ bilaterally  Abdomen: soft, NT/ND, no HSM  Musculoskeletal: strength 5/5 bilaterally, no c/c/e  Neurologic: A&O x 3, CN II-XII intact grossly    Results Review:   No visits with results within 2 Week(s) from this visit.   Latest known visit with results is:   Hospital Outpatient Visit on 07/24/2024   Component Date Value Ref Range Status    Right Common Femoral Spont 07/24/2024 Y   Final    Right Common Femoral Phasic 07/24/2024 Y   Final    Right Common Femoral Compress 07/24/2024 C   Final    Right Common Femoral Augment 07/24/2024 Y   Final    Right Saphenofemoral Junction Comp* 07/24/2024 C   Final    Right Proximal Femoral Compress 07/24/2024 C   Final    Right Mid Femoral Spont 07/24/2024 Y   Final    Right Mid Femoral Phasic 07/24/2024 Y   Final    Right Mid Femoral Compress 07/24/2024 C   Final    Right Mid Femoral Augment 07/24/2024 Y   Final    Right Distal Femoral Compress 07/24/2024 C   Final    Right Popliteal Spont 07/24/2024 Y   Final    Right Popliteal Phasic 07/24/2024 Y   Final    Right Popliteal Compress 07/24/2024 C   Final    Right Popliteal Augment 07/24/2024 Y   Final    Right Posterior Tibial Compress 07/24/2024 C   Final    Right Peroneal Compress 07/24/2024 C   Final    Right Gastronemius Compress " 07/24/2024 C   Final    Right Greater Saph AK Compress 07/24/2024 C   Final    Right Greater Saph BK Compress 07/24/2024 C   Final    Right Lesser Saph Compress 07/24/2024 C   Final    Right External Iliac Spont 07/24/2024 Y   Final    Right External Iliac Phasic 07/24/2024 Y   Final    Right External Iliac Compress 07/24/2024 C   Final    Right External Iliac Augment 07/24/2024 Y   Final    BH CV LOWER VASCULAR RIGHT ANTERIO* 07/24/2024 C   Final    Left Common Femoral Spont 07/24/2024 Y   Final    Left Common Femoral Phasic 07/24/2024 Y   Final    Left Common Femoral Augment 07/24/2024 Y   Final       CT Abdomen Pelvis With Contrast    Result Date: 10/7/2024  Narrative: CT ABDOMEN PELVIS W CONTRAST Date of Exam: 10/7/2024 9:37 AM EDT Indication: gonadal vein thrombus. Comparison: CT of the abdomen and pelvis dated 7/8/2024 Technique: Axial CT images were obtained of the abdomen and pelvis following the uneventful intravenous administration of 90 mL Isovue-370. Reconstructed coronal and sagittal images were also obtained. Automated exposure control and iterative construction methods were used. Findings: Liver: The liver is unremarkable in morphology. No focal liver lesion is seen. No biliary dilation is seen. Gallbladder: Unremarkable. Pancreas: Unremarkable. Spleen: Unremarkable. Adrenal glands: Unremarkable. Genitourinary tract: Kidneys are unremarkable. No hydronephrosis is seen. Ureters are not well visualized. Urinary bladder is decompressed which limits its evaluation. Status post hysterectomy. Gastrointestinal tract: The visualized hollow viscera demonstrate no focal lesion. There is no evidence of bowel obstruction. Appendix: The appendix is not definitely identified. Other findings: Mild pelvic free fluid. No free air. No pathologically enlarged lymph nodes are seen. The abdominal aorta and IVC appear unremarkable. The right gonadal vein and its previously noted intraluminal thrombus are not well  visualized on this study. Bones and soft tissues: No acute or suspicious osseous or soft tissue lesion is identified. Lung bases: The visualized lung bases are clear.     Impression: Impression: 1.No acute abnormality identified within the abdomen or pelvis. 2.The right gonadal vein and its previously noted intraluminal thrombus are not well visualized on this study. 3.Mild pelvic free fluid. 4.Additional findings as detailed above. Electronically Signed: Jimenez Dan MD  10/7/2024 10:13 AM EDT  Workstation ID: [id number]     Assessment / Plan      Assessment/Plan:   1. Acute deep vein thrombosis (DVT) of non-extremity vein (Primary)  -Unclear etiology at this time with appears to be unprovoked  -Bilateral lower extremity duplex without evidence of DVT or SVT  -Hypercoagulable workup only notable for mildly elevated anticardiolipin IgM to 13 and slightly decreased protein S which can be present in somebody with an acute clot  -CT abdomen/pelvis in July 2024 showing a small amount of residual thrombus within the right gonadal vein  -Repeat anticardiolipin IgM and protein S levels within normal limits in July 2024  -CT C/A/P in October 2024 without evidence of right gonadal vein thrombus  -Will plan to discontinue apixaban at this time  -Discussed signs and symptoms of recurrent DVT including not limited to lower extremity pain, swelling, redness, erythema, chest pain, shortness of breath  -Should patient develop another blood clot in the future, would recommend indefinite anticoagulation     2.  Rash  -Resolved     3.  Ovarian cyst  -Status post bilateral oophorectomy in June 2024  -Pathology without evidence of malignancy     4.  URI  -Will start Augmentin today      Other orders  -     amoxicillin-clavulanate (AUGMENTIN) 500-125 MG per tablet; Take 1 tablet by mouth 2 (Two) Times a Day.  Dispense: 14 tablet; Refill: 0         Follow Up:   Follow-up as needed     Devyn Harvey MD  Hematology and Oncology      Please note that portions of this note may have been completed with a voice recognition program. Efforts were made to edit the dictations, but occasionally words are mistranscribed.

## 2024-11-12 ENCOUNTER — PATIENT MESSAGE (OUTPATIENT)
Dept: ONCOLOGY | Facility: CLINIC | Age: 44
End: 2024-11-12
Payer: COMMERCIAL

## 2024-11-12 DIAGNOSIS — Z86.718 HISTORY OF DVT (DEEP VEIN THROMBOSIS): ICD-10-CM

## 2024-11-12 DIAGNOSIS — I82.90 ACUTE DEEP VEIN THROMBOSIS (DVT) OF NON-EXTREMITY VEIN: ICD-10-CM

## 2024-11-12 DIAGNOSIS — M79.605 LOWER EXTREMITY PAIN, BILATERAL: Primary | ICD-10-CM

## 2024-11-12 DIAGNOSIS — M79.604 LOWER EXTREMITY PAIN, BILATERAL: Primary | ICD-10-CM

## 2024-11-16 ENCOUNTER — APPOINTMENT (OUTPATIENT)
Dept: GENERAL RADIOLOGY | Facility: HOSPITAL | Age: 44
End: 2024-11-16
Payer: COMMERCIAL

## 2024-11-16 ENCOUNTER — APPOINTMENT (OUTPATIENT)
Dept: CT IMAGING | Facility: HOSPITAL | Age: 44
End: 2024-11-16
Payer: COMMERCIAL

## 2024-11-16 ENCOUNTER — HOSPITAL ENCOUNTER (EMERGENCY)
Facility: HOSPITAL | Age: 44
Discharge: HOME | End: 2024-11-16
Attending: EMERGENCY MEDICINE
Payer: COMMERCIAL

## 2024-11-16 VITALS
OXYGEN SATURATION: 100 % | RESPIRATION RATE: 16 BRPM | DIASTOLIC BLOOD PRESSURE: 55 MMHG | BODY MASS INDEX: 17.03 KG/M2 | HEIGHT: 69 IN | SYSTOLIC BLOOD PRESSURE: 117 MMHG | HEART RATE: 66 BPM | TEMPERATURE: 98.3 F | WEIGHT: 115 LBS

## 2024-11-16 DIAGNOSIS — U07.1 COVID-19: Primary | ICD-10-CM

## 2024-11-16 DIAGNOSIS — M79.10 MYALGIA: ICD-10-CM

## 2024-11-16 DIAGNOSIS — R10.31 RIGHT GROIN PAIN: ICD-10-CM

## 2024-11-16 LAB
ALBUMIN SERPL-MCNC: 4.8 G/DL (ref 3.5–5.2)
ALBUMIN/GLOB SERPL: 1.9 G/DL
ALP SERPL-CCNC: 65 U/L (ref 39–117)
ALT SERPL W P-5'-P-CCNC: 15 U/L (ref 1–33)
ANION GAP SERPL CALCULATED.3IONS-SCNC: 13 MMOL/L (ref 5–15)
AST SERPL-CCNC: 27 U/L (ref 1–32)
BASOPHILS # BLD AUTO: 0.04 10*3/MM3 (ref 0–0.2)
BASOPHILS NFR BLD AUTO: 0.6 % (ref 0–1.5)
BILIRUB SERPL-MCNC: 0.4 MG/DL (ref 0–1.2)
BILIRUB UR QL STRIP: NEGATIVE
BUN SERPL-MCNC: 13 MG/DL (ref 6–20)
BUN/CREAT SERPL: 14.4 (ref 7–25)
CALCIUM SPEC-SCNC: 9.5 MG/DL (ref 8.6–10.5)
CHLORIDE SERPL-SCNC: 107 MMOL/L (ref 98–107)
CK SERPL-CCNC: 92 U/L (ref 20–180)
CLARITY UR: CLEAR
CO2 SERPL-SCNC: 24 MMOL/L (ref 22–29)
COLOR UR: YELLOW
CREAT SERPL-MCNC: 0.9 MG/DL (ref 0.57–1)
D DIMER PPP FEU-MCNC: <0.27 MCGFEU/ML (ref 0–0.5)
D DIMER PPP FEU-MCNC: >20 MCGFEU/ML (ref 0–0.5)
DEPRECATED RDW RBC AUTO: 40 FL (ref 37–54)
EGFRCR SERPLBLD CKD-EPI 2021: 81.5 ML/MIN/1.73
EOSINOPHIL # BLD AUTO: 0.09 10*3/MM3 (ref 0–0.4)
EOSINOPHIL NFR BLD AUTO: 1.3 % (ref 0.3–6.2)
ERYTHROCYTE [DISTWIDTH] IN BLOOD BY AUTOMATED COUNT: 11.5 % (ref 12.3–15.4)
GLOBULIN UR ELPH-MCNC: 2.5 GM/DL
GLUCOSE SERPL-MCNC: 92 MG/DL (ref 65–99)
GLUCOSE UR STRIP-MCNC: NEGATIVE MG/DL
HCT VFR BLD AUTO: 42.5 % (ref 34–46.6)
HGB BLD-MCNC: 13.5 G/DL (ref 12–15.9)
HGB UR QL STRIP.AUTO: NEGATIVE
HOLD SPECIMEN: NORMAL
IMM GRANULOCYTES # BLD AUTO: 0.02 10*3/MM3 (ref 0–0.05)
IMM GRANULOCYTES NFR BLD AUTO: 0.3 % (ref 0–0.5)
KETONES UR QL STRIP: NEGATIVE
LEUKOCYTE ESTERASE UR QL STRIP.AUTO: NEGATIVE
LYMPHOCYTES # BLD AUTO: 1.57 10*3/MM3 (ref 0.7–3.1)
LYMPHOCYTES NFR BLD AUTO: 23.3 % (ref 19.6–45.3)
MCH RBC QN AUTO: 30.4 PG (ref 26.6–33)
MCHC RBC AUTO-ENTMCNC: 31.8 G/DL (ref 31.5–35.7)
MCV RBC AUTO: 95.7 FL (ref 79–97)
MONOCYTES # BLD AUTO: 0.39 10*3/MM3 (ref 0.1–0.9)
MONOCYTES NFR BLD AUTO: 5.8 % (ref 5–12)
NEUTROPHILS NFR BLD AUTO: 4.63 10*3/MM3 (ref 1.7–7)
NEUTROPHILS NFR BLD AUTO: 68.7 % (ref 42.7–76)
NITRITE UR QL STRIP: NEGATIVE
NRBC BLD AUTO-RTO: 0 /100 WBC (ref 0–0.2)
NT-PROBNP SERPL-MCNC: 323.1 PG/ML (ref 0–450)
PH UR STRIP.AUTO: 7 [PH] (ref 5–8)
PLATELET # BLD AUTO: 150 10*3/MM3 (ref 140–450)
PMV BLD AUTO: 11.2 FL (ref 6–12)
POTASSIUM SERPL-SCNC: 3.3 MMOL/L (ref 3.5–5.2)
PROT SERPL-MCNC: 7.3 G/DL (ref 6–8.5)
PROT UR QL STRIP: NEGATIVE
QT INTERVAL: 452 MS
QTC INTERVAL: 466 MS
RBC # BLD AUTO: 4.44 10*6/MM3 (ref 3.77–5.28)
SODIUM SERPL-SCNC: 144 MMOL/L (ref 136–145)
SP GR UR STRIP: >=1.099 (ref 1–1.03)
TROPONIN T SERPL HS-MCNC: <6 NG/L
UROBILINOGEN UR QL STRIP: ABNORMAL
WBC NRBC COR # BLD AUTO: 6.74 10*3/MM3 (ref 3.4–10.8)
WHOLE BLOOD HOLD COAG: NORMAL
WHOLE BLOOD HOLD SPECIMEN: NORMAL

## 2024-11-16 PROCEDURE — 80053 COMPREHEN METABOLIC PANEL: CPT | Performed by: EMERGENCY MEDICINE

## 2024-11-16 PROCEDURE — 25510000001 IOPAMIDOL PER 1 ML: Performed by: EMERGENCY MEDICINE

## 2024-11-16 PROCEDURE — 99285 EMERGENCY DEPT VISIT HI MDM: CPT

## 2024-11-16 PROCEDURE — 85025 COMPLETE CBC W/AUTO DIFF WBC: CPT | Performed by: EMERGENCY MEDICINE

## 2024-11-16 PROCEDURE — 82550 ASSAY OF CK (CPK): CPT | Performed by: EMERGENCY MEDICINE

## 2024-11-16 PROCEDURE — 74177 CT ABD & PELVIS W/CONTRAST: CPT

## 2024-11-16 PROCEDURE — 96374 THER/PROPH/DIAG INJ IV PUSH: CPT

## 2024-11-16 PROCEDURE — 25010000002 KETOROLAC TROMETHAMINE PER 15 MG: Performed by: EMERGENCY MEDICINE

## 2024-11-16 PROCEDURE — 81003 URINALYSIS AUTO W/O SCOPE: CPT | Performed by: EMERGENCY MEDICINE

## 2024-11-16 PROCEDURE — 84484 ASSAY OF TROPONIN QUANT: CPT | Performed by: EMERGENCY MEDICINE

## 2024-11-16 PROCEDURE — 83880 ASSAY OF NATRIURETIC PEPTIDE: CPT | Performed by: EMERGENCY MEDICINE

## 2024-11-16 PROCEDURE — 70450 CT HEAD/BRAIN W/O DYE: CPT

## 2024-11-16 PROCEDURE — 70496 CT ANGIOGRAPHY HEAD: CPT

## 2024-11-16 PROCEDURE — 71275 CT ANGIOGRAPHY CHEST: CPT

## 2024-11-16 PROCEDURE — 85379 FIBRIN DEGRADATION QUANT: CPT | Performed by: EMERGENCY MEDICINE

## 2024-11-16 PROCEDURE — 36415 COLL VENOUS BLD VENIPUNCTURE: CPT

## 2024-11-16 PROCEDURE — 71045 X-RAY EXAM CHEST 1 VIEW: CPT

## 2024-11-16 PROCEDURE — 70498 CT ANGIOGRAPHY NECK: CPT

## 2024-11-16 PROCEDURE — 93005 ELECTROCARDIOGRAM TRACING: CPT | Performed by: EMERGENCY MEDICINE

## 2024-11-16 RX ORDER — IOPAMIDOL 755 MG/ML
135 INJECTION, SOLUTION INTRAVASCULAR
Status: COMPLETED | OUTPATIENT
Start: 2024-11-16 | End: 2024-11-16

## 2024-11-16 RX ORDER — KETOROLAC TROMETHAMINE 15 MG/ML
15 INJECTION, SOLUTION INTRAMUSCULAR; INTRAVENOUS ONCE
Status: COMPLETED | OUTPATIENT
Start: 2024-11-16 | End: 2024-11-16

## 2024-11-16 RX ORDER — SODIUM CHLORIDE 0.9 % (FLUSH) 0.9 %
10 SYRINGE (ML) INJECTION AS NEEDED
Status: DISCONTINUED | OUTPATIENT
Start: 2024-11-16 | End: 2024-11-17 | Stop reason: HOSPADM

## 2024-11-16 RX ADMIN — KETOROLAC TROMETHAMINE 15 MG: 15 INJECTION, SOLUTION INTRAMUSCULAR; INTRAVENOUS at 18:57

## 2024-11-16 RX ADMIN — IOPAMIDOL 135 ML: 755 INJECTION, SOLUTION INTRAVENOUS at 19:47

## 2024-11-16 NOTE — Clinical Note
[facility] EMERGENCY DEPARTMENT  [address]  [facility] [address]  Phone: [phone]    Radha Florence was seen and treated in our emergency department on 11/16/2024.  She may return to work on 11/19/2024.         Thank you for choosing [facility].    Tylor Brandon MD

## 2024-11-16 NOTE — ED PROVIDER NOTES
"  [facility]    EMERGENCY DEPARTMENT ENCOUNTER      Pt Name: Radha Florence  MRN: [id number]  YOB: 1980  Date of evaluation: 11/16/2024  Provider: Tylor Brandon MD    CHIEF COMPLAINT       Chief Complaint   Patient presents with    Leg Pain    Groin Pain         HISTORY OF PRESENT ILLNESS   Radha Florence is a 43 y.o. female who presents to the emergency department for evaluation of multiple complaints.  Her initial complaint to me is right sided inguinal pain.  This has been intermittent over the past few days.  Her pain is most severe in the right inguinal area but is associated with widespread muscle aches, skin hyperesthesia as well.  These symptoms been present over 2 weeks in relation to a known COVID-19 diagnosis but patient states she feels she is just not getting any better from this.  She continues to have some shortness of breath described as a smothering sensation.  She describes an abnormal sensation on her right neck as if it is \"drawing up\".  There is not specifically any pain on the right side of the neck.  Patient is also noticed some episodic focal discoloration of her skin.  She states that the skin overlying her veins will turn yellow and then return to her normal skin color.  Her whole body does not turn yellow.  No change in the color of her eyes.    REVIEW OF SYSTEMS     ROS:  A chief complaint appropriate review of systems was completed and is negative except as noted in the HPI.      PAST MEDICAL HISTORY     Past Medical History:   Diagnosis Date    Migraine     Pneumonia          SURGICAL HISTORY       Past Surgical History:   Procedure Laterality Date    BILATERAL OOPHORECTOMY  06/24/2024    DISC REMOVAL      lumbar    HYSTERECTOMY  2023    Partial         CURRENT MEDICATIONS       Current Facility-Administered Medications:     sodium chloride 0.9 % flush 10 mL, 10 mL, Intravenous, PRN, Tylor Brandon MD    [COMPLETED] Insert Peripheral IV, , , Once **AND** sodium chloride 0.9 % " flush 10 mL, 10 mL, Intravenous, PRN, Tylor Brandon MD    Current Outpatient Medications:     albuterol sulfate  (90 Base) MCG/ACT inhaler, As Needed., Disp: , Rfl:     amoxicillin-clavulanate (AUGMENTIN) 500-125 MG per tablet, Take 1 tablet by mouth 2 (Two) Times a Day., Disp: 14 tablet, Rfl: 0    DULoxetine (CYMBALTA) 60 MG capsule, Take 1 capsule by mouth Daily., Disp: , Rfl:     gabapentin (NEURONTIN) 800 MG tablet, Take 1 tablet by mouth Daily., Disp: , Rfl:     HYDROcodone-acetaminophen (NORCO)  MG per tablet, Take 1 tablet by mouth Every 6 (Six) Hours As Needed for Moderate Pain., Disp: , Rfl:     PHARMACY MEDS TO BED CONSULT, Use Daily., Disp: , Rfl:     promethazine (PHENERGAN) 25 MG tablet, As Needed., Disp: , Rfl:     tiZANidine (ZANAFLEX) 4 MG tablet, Take 1 tablet by mouth At Night As Needed for Muscle Spasms., Disp: , Rfl:     topiramate (TOPAMAX) 100 MG tablet, Take 1 tablet by mouth Daily., Disp: , Rfl:     triamcinolone (KENALOG) 0.1 % cream, Apply 1 Application topically to the appropriate area as directed 2 (Two) Times a Day., Disp: , Rfl:     Ubrelvy 50 MG tablet, Take 1-2 tablets by mouth., Disp: , Rfl:     ALLERGIES     Vancomycin    FAMILY HISTORY       Family History   Problem Relation Age of Onset    Heart attack Mother     Heart disease Father     Lung disease Father     Diabetes Sister     Diabetes Brother           SOCIAL HISTORY       Social History     Socioeconomic History    Marital status:    Tobacco Use    Smoking status: Never    Smokeless tobacco: Never   Vaping Use    Vaping status: Never Used   Substance and Sexual Activity    Alcohol use: Never    Drug use: Never    Sexual activity: Defer         PHYSICAL EXAM    (up to 7 for level 4, 8 or more for level 5)     Vitals:    11/16/24 2200 11/16/24 2230 11/16/24 2300 11/16/24 2330   BP: 126/80 108/70 110/66 117/55   Patient Position:       Pulse: 77 68 62 66   Resp:  16 16 16   Temp:       TempSrc:       SpO2:  99% 99% 98% 100%   Weight:       Height:           Physical Exam  Constitutional:       General: She is not in acute distress.  HENT:      Head: Normocephalic and atraumatic.   Eyes:      Conjunctiva/sclera: Conjunctivae normal.      Pupils: Pupils are equal, round, and reactive to light.   Cardiovascular:      Rate and Rhythm: Normal rate and regular rhythm.      Pulses: Normal pulses.      Heart sounds: No murmur heard.     No gallop.   Pulmonary:      Effort: Pulmonary effort is normal. No respiratory distress.   Abdominal:      General: Abdomen is flat. There is no distension.      Tenderness: There is no abdominal tenderness. There is no guarding or rebound.   Musculoskeletal:         General: No swelling or deformity. Normal range of motion.      Comments: Normal range of motion of the bilateral upper and lower extremities, there is pain with range of motion of the right hip and to a lesser degree pain with range of motion of all joints.  The right lower extremity is warm dry and well-perfused.  There is no weakness in the bilateral lower extremities.  There are no sensory deficits   Skin:     General: Skin is warm and dry.      Capillary Refill: Capillary refill takes less than 2 seconds.   Neurological:      General: No focal deficit present.      Mental Status: She is alert and oriented to person, place, and time.      Comments: GCS 15.  Cranial Nerves II-XII intact without deficit.  Strength 5/5 in the bilateral upper extremities.  Strength 5/5 in the bilateral lower extremities.  Sensation to light touch intact throughout.  Cerebellar function intact via finger-nose-finger.     Psychiatric:         Mood and Affect: Mood normal.         Behavior: Behavior normal.            DIAGNOSTIC RESULTS     EKG: All EKGs are interpreted by the Emergency Department Physician who either signs or Co-signs this chart in the absence of a cardiologist.    ECG 12 Lead Chest Pain   Final Result   Test Reason : Chest Pain    Blood Pressure :   */*   mmHG   Vent. Rate :  64 BPM     Atrial Rate :  64 BPM      P-R Int : 150 ms          QRS Dur :  92 ms       QT Int : 452 ms       P-R-T Axes :   * 104  64 degrees     QTcB Int : 466 ms      Sinus rhythm with premature supraventricular complexes   Rightward axis   Incomplete right bundle branch block   Borderline ECG   When compared with ECG of 06-Apr-2024 16:37,   premature ventricular complexes are no longer present   premature supraventricular complexes are now present   Confirmed by MD LUTHER KYLE ([id number]) on 11/16/2024 5:02:25 PM      Referred By:            Confirmed By: GAMALIEL LUTHER MD            RADIOLOGY:   [x] Radiologist's Report Reviewed:  CT Head Without Contrast   Final Result   No acute intracranial pathology. Normal CTA of the head and neck         Electronically Signed: Palmer Boo MD     11/16/2024 7:58 PM EST     Workstation ID: [id number]      CT Angiogram Head   Final Result   No acute intracranial pathology. Normal CTA of the head and neck         Electronically Signed: Palmer Boo MD     11/16/2024 7:58 PM EST     Workstation ID: [id number]      CT Angiogram Neck   Final Result   No acute intracranial pathology. Normal CTA of the head and neck         Electronically Signed: Palmer Boo MD     11/16/2024 7:58 PM EST     Workstation ID: [id number]      CT Angiogram Chest   Final Result   No acute cardiopulmonary disease. 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 No acute abdominal or pelvic pathology.               Electronically Signed: Palmer Boo MD     11/16/2024 8:07 PM EST     Workstation ID: [id number]      CT Abdomen Pelvis With Contrast   Final Result   No acute cardiopulmonary disease. 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 No acute abdominal or pelvic pathology.               Electronically Signed: aPlmer Boo MD     11/16/2024 8:07 PM EST     Workstation ID:  [id number]      XR Chest 1 View   Final Result   Impression:   1.An acute pulmonary process is not apparent.         Electronically Signed: Alonso Yo MD     11/16/2024 5:37 PM EST     Workstation ID: [id number]          I ordered and independently reviewed the above noted radiographic studies.        LABS:  I independently interpreted all laboratory studies conducted during this ED visit.  The results of these studies can be seen below and my independent interpretation in the ED course      EMERGENCY DEPARTMENT COURSE and DIFFERENTIAL DIAGNOSIS/MDM:   Vitals:  AS OF 23:40 EST    BP - 117/55  HR - 66  TEMP - 98.3 °F (36.8 °C) (Oral)  O2 SATS - 100%        Discussion below represents my analysis of pertinent findings related to patient's condition, differential diagnosis, treatment plan and final disposition.      Differential diagnosis:  The differential diagnosis associated with the patient's presentation includes: Postviral syndrome/polyarthralgia, rhabdo, pneumonia, congestive heart failure, also consider rheumatologic conditions such as polymyositis, polymyalgia rheumatica, rheumatoid arthritis      Independent interpretations (ECG/rhythm strip/X-ray/US/CT scan): See ED course      Additional sources:  Discussed/obtained information from independent historians:   [] Spouse:   [] Parent:   [] Friend:   [] EMS:   [] Other:    External record review:  11/3/2024 reviewed ER visit at outside emergency department where patient was evaluated for multiple complaints and ultimately diagnosed with a COVID-19 infection      Patient's care impacted by:   [] Diabetes   [] Hypertension   [] Coronary Artery Disease   [] Cancer   [] Other:     Care significantly affected by Social Determinants of Health (housing and economic circumstances, unemployment)    [] Yes     [x] No   If yes, Patient's care significantly limited by  Social Determinants of Health including:    [] Inadequate housing    [] Low income    [] Alcoholism and  drug addiction in family    [] Problems related to primary support group    [] Unemployment    [] Problems related to employment    [] Other Social Determinants of Health:     ED Course:    ED Course as of 11/16/24 2340   Sat Nov 16, 2024   1701 Twelve-lead ECG independently interpreted by myself demonstrates normal sinus rhythm, no ST segment elevation or depression.  T wave inversions in leads II and III [KB]   1807 Consulted with on-call radiologist about diagnostic imaging.  Will pursue CTAs of the head and neck, chest, CT venous phase of the abdomen and pelvis which should evaluate vasculature in the inguinal area where patient is having pain [KB]   2339 CT scans of the head, chest abdomen pelvis independently interpreted by myself demonstrating no acute pathology [KB]   2339 Laboratory workup independently interpreted by myself notable only for an elevated D-dimer which is a lab error as repeat D-dimer is within normal limits [KB]      ED Course User Index  [KB] Tylor Brandon MD         Patient was evaluated on multiple occasions through her ER stay.  She maintains a overall well appearance with normal vital signs.  She states she does not feel much improvement after Toradol.    I initially had substantial concern given patient's history of clotting, her markedly elevated D-dimer however in retrospect this appears to be a lab error as a repeat D-dimer is within normal limits.    I do not have a full explanation for patient's symptoms though many of them could certainly be explained by an acute viral syndrome with her recent diagnosis of COVID-19 with mild her myalgias, arthralgias, brain fog.  He feels comfortable going home from the emergency room at this time.  I counseled her extensively on return precautions to the ER and the need for close primary care follow-up.  She was discharged in good condition    Prior to discharge from the emergency department I discussed with the patient and any family members  present the current diagnosis, treatment plan, recommendations regarding follow-up with a primary care doctor or specialist, general emergency room return precautions as well as return precautions specific to their diagnosis and treatment.  The patient/family indicated understanding of these instructions.  Time was allotted to answer questions at that time and throughout the ED course.       PROCEDURES:  Procedures    CRITICAL CARE TIME        CONSULTS       FINAL IMPRESSION      1. COVID-19    2. Myalgia    3. Right groin pain          DISPOSITION/PLAN     ED Disposition       ED Disposition   Discharge    Condition   Stable    Comment   --                 Comment: Please note this report has been produced using speech recognition software.      Tylor Brandon MD  Attending Emergency Physician    Recent Results (from the past 24 hours)   Gray Top    Collection Time: 11/16/24  3:50 PM   Result Value Ref Range    Extra Tube Hold for add-ons.    Light Blue Top    Collection Time: 11/16/24  3:50 PM   Result Value Ref Range    Extra Tube Hold for add-ons.    D-dimer, Quantitative    Collection Time: 11/16/24  3:50 PM    Specimen: Blood   Result Value Ref Range    D-Dimer, Quantitative >20.00 (H) 0.00 - 0.50 MCGFEU/mL   Green Top (Gel)    Collection Time: 11/16/24  4:47 PM   Result Value Ref Range    Extra Tube Hold for add-ons.    Lavender Top    Collection Time: 11/16/24  4:47 PM   Result Value Ref Range    Extra Tube hold for add-on    Gold Top - SST    Collection Time: 11/16/24  4:47 PM   Result Value Ref Range    Extra Tube Hold for add-ons.    Comprehensive Metabolic Panel    Collection Time: 11/16/24  4:47 PM    Specimen: Arm, Right; Blood   Result Value Ref Range    Glucose 92 65 - 99 mg/dL    BUN 13 6 - 20 mg/dL    Creatinine 0.90 0.57 - 1.00 mg/dL    Sodium 144 136 - 145 mmol/L    Potassium 3.3 (L) 3.5 - 5.2 mmol/L    Chloride 107 98 - 107 mmol/L    CO2 24.0 22.0 - 29.0 mmol/L    Calcium 9.5 8.6 - 10.5 mg/dL     Total Protein 7.3 6.0 - 8.5 g/dL    Albumin 4.8 3.5 - 5.2 g/dL    ALT (SGPT) 15 1 - 33 U/L    AST (SGOT) 27 1 - 32 U/L    Alkaline Phosphatase 65 39 - 117 U/L    Total Bilirubin 0.4 0.0 - 1.2 mg/dL    Globulin 2.5 gm/dL    A/G Ratio 1.9 g/dL    BUN/Creatinine Ratio 14.4 7.0 - 25.0    Anion Gap 13.0 5.0 - 15.0 mmol/L    eGFR 81.5 >60.0 mL/min/1.73   CK    Collection Time: 11/16/24  4:47 PM    Specimen: Arm, Right; Blood   Result Value Ref Range    Creatine Kinase 92 20 - 180 U/L   CBC Auto Differential    Collection Time: 11/16/24  4:47 PM    Specimen: Arm, Right; Blood   Result Value Ref Range    WBC 6.74 3.40 - 10.80 10*3/mm3    RBC 4.44 3.77 - 5.28 10*6/mm3    Hemoglobin 13.5 12.0 - 15.9 g/dL    Hematocrit 42.5 34.0 - 46.6 %    MCV 95.7 79.0 - 97.0 fL    MCH 30.4 26.6 - 33.0 pg    MCHC 31.8 31.5 - 35.7 g/dL    RDW 11.5 (L) 12.3 - 15.4 %    RDW-SD 40.0 37.0 - 54.0 fl    MPV 11.2 6.0 - 12.0 fL    Platelets 150 140 - 450 10*3/mm3    Neutrophil % 68.7 42.7 - 76.0 %    Lymphocyte % 23.3 19.6 - 45.3 %    Monocyte % 5.8 5.0 - 12.0 %    Eosinophil % 1.3 0.3 - 6.2 %    Basophil % 0.6 0.0 - 1.5 %    Immature Grans % 0.3 0.0 - 0.5 %    Neutrophils, Absolute 4.63 1.70 - 7.00 10*3/mm3    Lymphocytes, Absolute 1.57 0.70 - 3.10 10*3/mm3    Monocytes, Absolute 0.39 0.10 - 0.90 10*3/mm3    Eosinophils, Absolute 0.09 0.00 - 0.40 10*3/mm3    Basophils, Absolute 0.04 0.00 - 0.20 10*3/mm3    Immature Grans, Absolute 0.02 0.00 - 0.05 10*3/mm3    nRBC 0.0 0.0 - 0.2 /100 WBC   Single High Sensitivity Troponin T    Collection Time: 11/16/24  4:47 PM    Specimen: Arm, Right; Blood   Result Value Ref Range    HS Troponin T <6 <14 ng/L   proBNP    Collection Time: 11/16/24  4:47 PM    Specimen: Arm, Right; Blood   Result Value Ref Range    proBNP 323.1 0.0 - 450.0 pg/mL   ECG 12 Lead Chest Pain    Collection Time: 11/16/24  4:56 PM   Result Value Ref Range    QT Interval 452 ms    QTC Interval 466 ms   Urinalysis With Microscopic If  Indicated (No Culture) - Urine, Clean Catch    Collection Time: 11/16/24  8:49 PM    Specimen: Urine, Clean Catch   Result Value Ref Range    Color, UA Yellow Yellow, Straw    Appearance, UA Clear Clear    pH, UA 7.0 5.0 - 8.0    Specific Gravity, UA >=1.099 (H) 1.001 - 1.030    Glucose, UA Negative Negative    Ketones, UA Negative Negative    Bilirubin, UA Negative Negative    Blood, UA Negative Negative    Protein, UA Negative Negative    Leuk Esterase, UA Negative Negative    Nitrite, UA Negative Negative    Urobilinogen, UA 0.2 E.U./dL 0.2 - 1.0 E.U./dL   D-dimer, Quantitative    Collection Time: 11/16/24 10:29 PM    Specimen: Blood   Result Value Ref Range    D-Dimer, Quantitative <0.27 0.00 - 0.50 MCGFEU/mL     Note: In addition to lab results from this visit, the labs listed above may include labs taken at another facility or during a different encounter within the last 24 hours. Please correlate lab times with ED admission and discharge times for further clarification of the services performed during this visit.                 Tylor Brandon MD  11/16/24 [id number]

## 2024-11-17 NOTE — DISCHARGE INSTRUCTIONS
Call to schedule a close follow-up with your primary care doctor.  Return to the ER as needed for any new or worsening symptoms.  Get plenty of rest and drink plenty fluids to stay well-hydrated

## 2024-12-19 ENCOUNTER — OFFICE VISIT (OUTPATIENT)
Dept: CARDIOLOGY | Facility: CLINIC | Age: 44
End: 2024-12-19
Payer: COMMERCIAL

## 2024-12-19 VITALS
HEART RATE: 69 BPM | DIASTOLIC BLOOD PRESSURE: 78 MMHG | WEIGHT: 114 LBS | OXYGEN SATURATION: 97 % | BODY MASS INDEX: 16.88 KG/M2 | HEIGHT: 69 IN | SYSTOLIC BLOOD PRESSURE: 116 MMHG

## 2024-12-19 DIAGNOSIS — R00.2 PALPITATIONS: Primary | ICD-10-CM

## 2024-12-19 PROCEDURE — 99213 OFFICE O/P EST LOW 20 MIN: CPT | Performed by: INTERNAL MEDICINE

## 2024-12-19 RX ORDER — UBROGEPANT 50 MG/1
50 TABLET ORAL AS NEEDED
COMMUNITY
Start: 2024-12-10 | End: 2025-03-10

## 2024-12-19 RX ORDER — AMITRIPTYLINE HYDROCHLORIDE 100 MG/1
100 TABLET ORAL NIGHTLY
COMMUNITY
Start: 2024-10-30 | End: 2025-01-28

## 2024-12-19 RX ORDER — LACTOSE-REDUCED FOOD 0.04G-1/ML
237 LIQUID (ML) ORAL
COMMUNITY
Start: 2024-10-30 | End: 2025-10-30

## 2024-12-19 RX ORDER — DOXEPIN HYDROCHLORIDE 10 MG/1
10 CAPSULE ORAL NIGHTLY
COMMUNITY
Start: 2024-12-10 | End: 2025-03-10

## 2024-12-19 RX ORDER — MEDICAL SUPPLY, MISCELLANEOUS
237 EACH MISCELLANEOUS DAILY
COMMUNITY
Start: 2024-10-30 | End: 2025-10-30

## 2024-12-19 RX ORDER — LIDOCAINE 4 G/G
PATCH TOPICAL AS NEEDED
COMMUNITY
Start: 2024-07-22

## 2024-12-19 NOTE — PROGRESS NOTES
[facility] Cardiology  Consultation H&P  Radha Florence  1980  [address]   [place] [address]     VISIT DATE:  12/19/24    PCP: Chantel Baker MD  [address]  [place] [address]    IDENTIFICATION: A 44 y.o. female  [employer] employee    PROBLEM LIST:  Family Hx CAD, venous thrombosis, aneurysm(brother)    2/24 CCS 0    11/24 CTA chest no PE , aneurysm, or atherosclerosis  Abnormal EKG  Echo, 1/19 (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  CC:  Chief Complaint   Patient presents with    Coronary Artery Disease     1 year       Allergies  Allergies   Allergen Reactions    Vancomycin Other (See Comments)     Vancomycin infusion reaction after completion of administration; tolerated w/ slowed infusion rate  Red Man syndrome - not an allergy        Current Medications  Current Outpatient Medications   Medication Instructions    albuterol sulfate  (90 Base) MCG/ACT inhaler As Needed.    amoxicillin-clavulanate (AUGMENTIN) 500-125 MG per tablet 500 mg, Oral, 2 Times Daily    DULoxetine (CYMBALTA) 60 mg, Oral, Daily    gabapentin (NEURONTIN) 800 mg, Oral, Daily    HYDROcodone-acetaminophen (NORCO)  MG per tablet 1 tablet, Oral, Every 6 Hours PRN    PHARMACY MEDS TO BED CONSULT Not Applicable, Daily    promethazine (PHENERGAN) 25 MG tablet As Needed.    tiZANidine (ZANAFLEX) 4 mg, Oral, Nightly PRN    topiramate (TOPAMAX) 100 mg, Oral, Daily    triamcinolone (KENALOG) 0.1 % cream 1 Application, Topical, 2 Times Daily        History of Present Illness   HPI  Radha Florence is a 44 y.o. year old female with the above mentioned PMH who presents for fu.  Had COVID a month ago and has not totally recovered.  She  "was having fluttering palpitations time of COVID and now has some diffuse neuropathic symptoms.      Vitals:    12/19/24 1435   Weight: 51.7 kg (114 lb)   Height: 175.3 cm (69\")       Body mass index is 16.83 kg/m².     PHYSICAL EXAMINATION:  Constitutional:       Appearance: Not in distress. Frail.   Neck:      Vascular: No JVR. JVD normal.   Pulmonary:      Effort: Pulmonary effort is normal.      Breath sounds: Normal breath sounds. No wheezing. No rhonchi. No rales.   Chest:      Chest wall: Not tender to palpatation.   Cardiovascular:      PMI at left midclavicular line. Normal rate. Regular rhythm. Normal S1. Normal S2.       Murmurs: There is no murmur.      No gallop.  No click. No rub.   Pulses:     Intact distal pulses.   Edema:     Peripheral edema absent.   Abdominal:      General: Bowel sounds are normal.      Palpations: Abdomen is soft.      Tenderness: There is no abdominal tenderness.   Musculoskeletal: Normal range of motion.         General: No tenderness. Skin:     General: Skin is warm and dry.   Neurological:      General: No focal deficit present.      Mental Status: Alert and oriented to person, place and time.         Diagnostic Data:  Procedures    Labs:    Lab Results   Component Value Date    GLUCOSE 92 11/16/2024    CALCIUM 9.5 11/16/2024     11/16/2024    K 3.3 (L) 11/16/2024    CO2 24.0 11/16/2024     11/16/2024    BUN 13 11/16/2024    CREATININE 0.90 11/16/2024    EGFR 81.5 11/16/2024    BCR 14.4 11/16/2024    ANIONGAP 13.0 11/16/2024     No results found for: \"HGBA1C\"  Lab Results   Component Value Date    WBC 4.23 11/20/2024    HGB 12.9 11/20/2024    HCT 40.2 11/20/2024    MCV 94 11/20/2024     (L) 11/20/2024     Lipid, 2/2021:  153/59/60/81  TSH, 7/2023:  0.75  A1c, 2/2021:  5.5      Advance Care Planning   ACP discussion was held with the patient during this visit. Patient has an advance directive in EMR which is still valid.          ASSESSMENT:  No diagnosis " found.      PLAN:  Family history precocious atherosclerosis cardiac calcium score 0 continued dietary therapy    Follow-up as needed            Chantel Baker MD, thank you for referring Ms. Florence for evaluation.  I have forwarded my electronically generated recommendations to you for review.  Please do not hesitate to call with any questions.      Palmer Suarez MD, FACC